# Patient Record
Sex: MALE | Race: WHITE
[De-identification: names, ages, dates, MRNs, and addresses within clinical notes are randomized per-mention and may not be internally consistent; named-entity substitution may affect disease eponyms.]

---

## 2018-04-19 ENCOUNTER — HOSPITAL ENCOUNTER (OUTPATIENT)
Dept: HOSPITAL 47 - CATHCVL | Age: 78
End: 2018-04-19
Payer: MEDICARE

## 2018-04-19 VITALS — BODY MASS INDEX: 32.1 KG/M2

## 2018-04-19 VITALS — RESPIRATION RATE: 16 BRPM

## 2018-04-19 VITALS — DIASTOLIC BLOOD PRESSURE: 62 MMHG | SYSTOLIC BLOOD PRESSURE: 123 MMHG | HEART RATE: 56 BPM

## 2018-04-19 VITALS — TEMPERATURE: 97.7 F

## 2018-04-19 DIAGNOSIS — I25.10: ICD-10-CM

## 2018-04-19 DIAGNOSIS — I08.0: Primary | ICD-10-CM

## 2018-04-19 DIAGNOSIS — Z95.5: ICD-10-CM

## 2018-04-19 DIAGNOSIS — I10: ICD-10-CM

## 2018-04-19 DIAGNOSIS — E11.9: ICD-10-CM

## 2018-04-19 DIAGNOSIS — Z79.899: ICD-10-CM

## 2018-04-19 DIAGNOSIS — Z79.84: ICD-10-CM

## 2018-04-19 DIAGNOSIS — Z79.82: ICD-10-CM

## 2018-04-19 DIAGNOSIS — Z95.1: ICD-10-CM

## 2018-04-19 DIAGNOSIS — Z87.891: ICD-10-CM

## 2018-04-19 DIAGNOSIS — Z82.49: ICD-10-CM

## 2018-04-19 DIAGNOSIS — E78.5: ICD-10-CM

## 2018-04-19 LAB — GLUCOSE BLD-MCNC: 116 MG/DL (ref 75–99)

## 2018-04-19 PROCEDURE — 93320 DOPPLER ECHO COMPLETE: CPT

## 2018-04-19 PROCEDURE — 93325 DOPPLER ECHO COLOR FLOW MAPG: CPT

## 2018-04-19 PROCEDURE — 93312 ECHO TRANSESOPHAGEAL: CPT

## 2018-04-19 RX ADMIN — BENZOCAINE ONE SPRAY: 200 SPRAY DENTAL; ORAL; PERIODONTAL at 11:39

## 2018-04-19 RX ADMIN — BENZOCAINE ONE SPRAY: 200 SPRAY DENTAL; ORAL; PERIODONTAL at 11:33

## 2018-05-07 NOTE — P.TEE
Indications for Procedure(s): 


Assessment of aortic stenosis


Date of Procedure: 04/19/18


Preoperative Diagnosis: 


Severe aortic stenosis


Postoperative Diagnosis: 


Moderate to severe aortic stenosis


Procedure(s) Performed: 


LAURENCE examination


Description of Procedure(s): 





INDICATION: This patient with history of previous bypass surgery was noted to 

have increasing gradient across the aortic valve.  Recent values are suggestive 

of possible severe aortic stenosis.  Patient is advised to have LAURENCE examination 

for further evaluation.





CONSENT: Informed consent was obtained verbally from patient





PROCEDURE:, Patient was brought to the lab in a fasting state.  He was prepped 

and draped in the usual fashion.  He was given IV Versed 2 mg and 50 g of 

fentanyl for conscious sedation.  Patient was continuously monitored.  A 

lubricated Omni probe was introduced into the oropharynx after his total was 

sprayed with Cetacaine.  The probe was gently advanced into the esophagus and 

stomach.  Multiple views were obtained.  Color and pulse  Doppler studies were 

performed.  Saline contrast bubble injections also performed.  Patient 

tolerated the procedure well





FINDINGS:.  The aortic valve appeared to be tricuspid with calcification and 

restricted opening excursion.  The valve area is calculated as 0.6 2.8 by 

planimetry.  He.  Gradient of 46 with a mean of 22 was obtained across the 

aortic valve.  These findings are size to moderate to severe aortic stenosis.  

Mitral valve showed mild regurgitation.  Left atrial appendage is of free of 

any clot.  The interatrial septum appeared to be intact without any spontaneous 

shunt.  Contrast bubble injection also did not reveal any shunting.  Left ankle 

function is good.  No Sigmund plaque in the aorta





IMPRESSION: #1.  Moderate to severe aortic stenosis.  #2.  No clot in the left 

atrial appendage.  #3.  No PFO #4.  Left ankle function is preserved





PLAN:.  Continue medical therapy.  Patient did a need cardiac catheterization 

to rule out any progression of ischemic heart disease and possible aortic valve 

replacement in the near future

## 2019-06-11 ENCOUNTER — HOSPITAL ENCOUNTER (OUTPATIENT)
Dept: HOSPITAL 47 - CATHCVL | Age: 79
LOS: 1 days | Discharge: HOME | End: 2019-06-12
Attending: INTERNAL MEDICINE
Payer: MEDICARE

## 2019-06-11 VITALS — BODY MASS INDEX: 32.3 KG/M2

## 2019-06-11 DIAGNOSIS — I25.82: ICD-10-CM

## 2019-06-11 DIAGNOSIS — I35.0: ICD-10-CM

## 2019-06-11 DIAGNOSIS — Z79.84: ICD-10-CM

## 2019-06-11 DIAGNOSIS — Z87.891: ICD-10-CM

## 2019-06-11 DIAGNOSIS — I25.810: ICD-10-CM

## 2019-06-11 DIAGNOSIS — I77.1: ICD-10-CM

## 2019-06-11 DIAGNOSIS — Z95.5: ICD-10-CM

## 2019-06-11 DIAGNOSIS — Z79.899: ICD-10-CM

## 2019-06-11 DIAGNOSIS — I25.10: Primary | ICD-10-CM

## 2019-06-11 DIAGNOSIS — Z79.82: ICD-10-CM

## 2019-06-11 DIAGNOSIS — E78.5: ICD-10-CM

## 2019-06-11 DIAGNOSIS — I10: ICD-10-CM

## 2019-06-11 DIAGNOSIS — I25.84: ICD-10-CM

## 2019-06-11 DIAGNOSIS — E78.00: ICD-10-CM

## 2019-06-11 DIAGNOSIS — E11.9: ICD-10-CM

## 2019-06-11 LAB
GLUCOSE BLD-MCNC: 124 MG/DL (ref 75–99)
GLUCOSE BLD-MCNC: 127 MG/DL (ref 75–99)
SAO2 % BLDA: 75.3 %
SAO2 % BLDA: 75.8 %
SAO2 % BLDA: 98.2 %

## 2019-06-11 PROCEDURE — 82810 BLOOD GASES O2 SAT ONLY: CPT

## 2019-06-11 PROCEDURE — 85025 COMPLETE CBC W/AUTO DIFF WBC: CPT

## 2019-06-11 PROCEDURE — 93461 R&L HRT ART/VENTRICLE ANGIO: CPT

## 2019-06-11 PROCEDURE — 85018 HEMOGLOBIN: CPT

## 2019-06-11 PROCEDURE — 80048 BASIC METABOLIC PNL TOTAL CA: CPT

## 2019-06-11 RX ADMIN — LISINOPRIL SCH MG: 20 TABLET ORAL at 16:44

## 2019-06-11 NOTE — PTCA
PERCUTANEOUSTRANS CORORONARY ANGIOGRAPHY



DATE OF SERVICE:

06/11/2019



PROCEDURE:

PTCA and stenting of saphenous vein graft to RCA in the proximal and mid portion of the

body of the graft.



PERFORMED BY:

Dr. CASSI Moon.



SEDATION:

Moderate conscious sedation time was 41 minutes.  The patient was administered Versed.

His oxygen saturation, hemodynamics and EKG were monitored closely.



CLINICAL INFORMATION:

Mr. Tim Meyers is a 78-year-old gentleman with diabetes, hypertension,

hyperlipidemia, CAD, prior bypass surgery and also mild to moderate aortic stenosis.

Apparently underwent aortocoronary bypass surgery in 1990 with LIMA to LAD, vein graft

to the obtuse marginal and vein graft to the RCA.  The vein graft to the obtuse

marginal was occluded.  He had a stenting of native circumflex performed in 2002.  He

came in with symptoms of unstable angina and also has aortic stenosis.  Dr. Farr

performed a right and left heart catheterization and study revealed that there was a

significant disease involving the vein graft to the RCA, both in the proximal and

distal portion of about 90% and 80% of the calcification.  He was advised intervention

that was performed in the same setting.  There was some tortuosity in the right iliac

area.  A Glidewire was used to gain access.



PROCEDURE NOTE:

The existing 6-Tamazight introducer in the right femoral artery was used to perform

procedure.  A Glidewire was used to advance and a right coronary bypass guide catheter

was used to cannulate the coronary artery.  A run-through wire was used to cross the

lesion.  Predilatation was performed with a 6 well mm long 2.5 caliber NC Trek balloon.

Multiple inflations were given.  The distal lesion was quite tight and at 15

atmospheres.  The balloon burst but there was improvement in lesion.  No consequences

occurred.  Then, I went over with a 3.25 caliber 15 mm long Xience stent and deployed

this at the distal lesion.  A 12 mm long 3.5 caliber Xience stent in the proximal

portion was deployed.  Patient received Angiomax bolus and infusion.  He received 180

mg of Brilinta.  Excellent angiographic result without complication was achieved.  The

arterial sheath was taken out and Angio-Seal device used to secure hemostasis.  The

venous sheath was sutured in and she was sent to the [facility] for the sheath to be pulled in

1 hour.



Excellent angiographic result without complication was achieved.  Findings were

discussed with the patient, his wife and also Dr. Farr.  I expect he will be

discharged tomorrow.





VALDO / TAVONN: [id number] / Job#: [id number]

## 2019-06-11 NOTE — P.CARDCATH
Date of Procedure: 06/11/19


Preoperative Diagnosis: 


Severe aortic stenosis by echo and also known ischemic heart disease


Postoperative Diagnosis: 


Noncritical aortic stenosis.  Critical lesions involving the RCA graft


Procedure(s) Performed: 


Right and left heart catheterization without left ventriculography


Description of Procedure: 


HISTORY: This is a 78-year-old gentleman with history of ischemic heart disease 

with a previous bypass surgery with the LIMA graft to the LAD, vein graft to the

OM branch and also vein graft to the RCA.  In 2002, patient was found to have 

total occlusion of the vein graft to the intermediate/OM branch and patent LIMA 

graft to the LAD and vein graft to the RCA.  Patient had stent placement of the 

OM branch.  Recently patient has been complaining of some chest pain and 

shortness of breath.  Patient had echocardiogram and LAURENCE were suggestive of 

severe aortic stenosis.  Patient is advised to have a right and left heart 

catheterization for definitive diagnosis





CONSENT:I have discussed the risks, benefits and alternative therapies for the 

above-mentioned procedure and for both sedation/analgesia as well as necessary 

blood product administration, if indicated, as they pertain to this patient.  

The patient has indicated understanding and acceptance of the risks and 

procedures discussed.











PROCEDURE: Left heart catheterization: Patient was brought to the lab in a 

fasting state.  Patient was given some IV sedation.  The right groin is 

infiltrated with lidocaine and right femoral artery was entered using Seldinger 

technique.  A 6-Malay catheter was left in place and selective coronary 

arteriography  was performed.  Patient tolerated the procedure well.  Femoral 

angiogram was performed .  Patient was found to have a critical lesion involving

the vein graft to the RCA.  He went on to have stent placement of the graft to 

the RCA.





                  Right heart catheterization: Right heart catheterization was 

performed using a Bomont-Piper catheter under Seldinger technique.  Patient 

tolerated the procedure well.





Hemodynamics: #1.  Right heart catheterization: Right atrial pressure was about 

4-6.  Right ventricular pressure was about 42/6.  Pulmonary artery pressure was 

about 42/8-12.  Pulmonary wedge pressure is about 12-14.  Cardiac output by 

thermodilution method was 4.9.  By Ashli method was 5.2.  The gradient across the

aortic valve was only 8.  There appears to be gradient between the aorta and f

emoral artery suggestive of aortoiliac disease.





              





Conscious Sedation: Versed 1 mg


Fentanyl 50 g


Duration to 2 minutes   








SELECTIVE CORONARY ARTERIOGRAPHY: 


                          LEFT MAIN: Could not be selectively engaged.  It 

provides only blood flow to the circumflex system.  Subselectively injected


                          THE LEFT ANTERIOR DESCENDING CORONARY ARTERY: .  This 

is totally occluded


                          THE LEFT CIRCUMFLEX AND IS CORONARY ARTERY: Consistent

OM branch and 2 other branches which appear to be patent and free of occlusive 

disease


                          THE RIGHT CORONARY ARTERY: .  Total occluded





                          The VEIN GRAFT TO THE RCA: This is moderate in caliber

with a diffuse plaque.  There is critical 95% stenosis in the proximal and 

distal portion of the body of the graft.  The distal native vessels appear to be

relatively small in caliber.





                          THE LIMA GRAFT TO THE LAD: The LIMA graft to LAD is 

patent throat its length except there appears to be kinking of the distal 

anastomosis.  This has been stable.  The distal LAD shows mild plaque without 

any significant lesions





      





LEFT VENTRICULOGRAPHY: Not performed





FINAL IMPRESSION: #1.  Noncritical aortic stenosis.  #2.  Critical lesion 

involving the vein graft to the RCA





PLAN: And placement of the graft to the RCA





PROGNOSIS: Guarded

## 2019-06-12 VITALS
RESPIRATION RATE: 20 BRPM | SYSTOLIC BLOOD PRESSURE: 171 MMHG | DIASTOLIC BLOOD PRESSURE: 78 MMHG | TEMPERATURE: 98.7 F | HEART RATE: 71 BPM

## 2019-06-12 LAB
ANION GAP SERPL CALC-SCNC: 7 MMOL/L
BUN SERPL-SCNC: 17 MG/DL (ref 9–20)
CALCIUM SPEC-MCNC: 9 MG/DL (ref 8.4–10.2)
CELLS COUNTED: 100
CHLORIDE SERPL-SCNC: 109 MMOL/L (ref 98–107)
CO2 SERPL-SCNC: 23 MMOL/L (ref 22–30)
EOSINOPHIL # BLD MANUAL: 0.28 K/UL (ref 0–0.7)
ERYTHROCYTE [DISTWIDTH] IN BLOOD BY AUTOMATED COUNT: 4.77 M/UL (ref 4.3–5.9)
ERYTHROCYTE [DISTWIDTH] IN BLOOD: 15.8 % (ref 11.5–15.5)
GLUCOSE SERPL-MCNC: 122 MG/DL (ref 74–99)
HCT VFR BLD AUTO: 40.8 % (ref 39–53)
HGB BLD-MCNC: 13.1 GM/DL (ref 13–17.5)
LYMPHOCYTES # BLD MANUAL: 1.56 K/UL (ref 1–4.8)
MCH RBC QN AUTO: 27.5 PG (ref 25–35)
MCHC RBC AUTO-ENTMCNC: 32.1 G/DL (ref 31–37)
MCV RBC AUTO: 85.6 FL (ref 80–100)
MONOCYTES # BLD MANUAL: 1.28 K/UL (ref 0–1)
NEUTROPHILS NFR BLD MANUAL: 78 %
NEUTS SEG # BLD MANUAL: 11.08 K/UL (ref 1.3–7.7)
PLATELET # BLD AUTO: 290 K/UL (ref 150–450)
POTASSIUM SERPL-SCNC: 4.7 MMOL/L (ref 3.5–5.1)
SODIUM SERPL-SCNC: 139 MMOL/L (ref 137–145)
WBC # BLD AUTO: 14.2 K/UL (ref 3.8–10.6)

## 2019-06-12 RX ADMIN — LISINOPRIL SCH MG: 20 TABLET ORAL at 08:36

## 2019-06-12 NOTE — P.PN
Subjective


Progress Note Date: 06/12/19


discharge note





This is a 78-year-old  gentleman with history of ischemic heart disease

with prior bypass surgery with a LIMA to the LAD, vein graft to the OM branch 

and also vein graft to the RCA.  In 2002 he was found to have a total occlusion 

of the vein graft to the intermediate OM branch and a patent LIMA graft to the 

LAD and vein graft to the RCA.  Patient had a stent placement of the OM branch. 

Recently the patient had been complaining of some chest discomfort with 

associated shortness of breath, he had an echo and a LAURENCE which were suggestive 

of aortic stenosis.  He was brought to the hospital by Dr. Farr underwent

a cardiac catheterization yesterdaysubsequent stenting of the saphenous vein 

graft to the right coronary artery in the proximal and midportion of the 

graft.patient was seen and examined this morning, feels well, denies any chest 

pain or difficulty in breathing.  Hemodynamically stable.  Laboratory data was 

reviewed and is within normal limits.  EKG from this morning shows a normal 

sinus rhythm with no acute changes from post-PCI.








Objective





- Vital Signs


Vital signs: 


                                   Vital Signs











Temp  98.7 F   06/12/19 08:00


 


Pulse  71   06/12/19 08:00


 


Resp  20   06/12/19 08:00


 


BP  171/78   06/12/19 08:00


 


Pulse Ox  98   06/12/19 08:00








                                 Intake & Output











 06/11/19 06/12/19 06/12/19





 18:59 06:59 18:59


 


Intake Total 1610 675 


 


Output Total 700 900 


 


Balance 910 -225 


 


Weight  91.1 kg 


 


Intake:   


 


  IV 1410 675 


 


    IV Fluid Continuation 1, 1000  





    000 ml @ 0 mls/hr IV .STK   





    -[facility] Rx#:[id number]   


 


    Sodium Chloride 0.9% 1, 75 675 





    000 ml @ 75 mls/hr IV .   





    [id number] [facility] Rx#:[id number]   


 


  Oral 200  


 


Output:   


 


  Urine 700 900 


 


Other:   


 


  Voiding Method   Urinal


 


  # Voids  1 














- Exam





PHYSICAL EXAMINATION: 





GENERAL:78-year-old  gentleman in no acute distress at the time of my 

examination





HEENT: Head is atraumatic, normocephalic.  Pupils equal, round.  Sclera 

anicteric. Conjunctiva are clear.  Mucous membranes of the mouth are moist.  

Neck is supple.  There is no elevated jugular venous pressure.no carotid  bruit 

is heard.





HEART EXAMINATION: [Heart S1, S2 systolic murmur is heard.  No murmur or gallop 

heard.]





CHEST EXAMINATION:[ Lungs are clear to auscultation and precussion. No chest 

wall tenderness is noted on palpation or with deep breathing.]





ABDOMEN: [ Soft, nontender. Bowel sounds are heard. No organomegaly noted].


 


EXTREMITIES:[ 2+ peripheral pulses with no evidence of peripheral edema and no 

calf tenderness noted].right groin soft, no evidence of any hematoma.





NEUROLOGIC [patient is awake, alert and oriented X3.]


 


.


 








- Labs


CBC & Chem 7: 


                                 06/12/19 05:57





                                 06/12/19 05:57


Labs: 


                  Abnormal Lab Results - Last 24 Hours (Table)











  06/11/19 06/12/19 06/12/19 Range/Units





  16:38 05:57 05:57 


 


WBC   14.2 H   (3.8-10.6)  k/uL


 


RDW   15.8 H   (11.5-15.5)  %


 


Neutrophils # (Manual)   11.08 H   (1.3-7.7)  k/uL


 


Monocytes # (Manual)   1.28 H   (0-1.0)  k/uL


 


Chloride    109 H  ()  mmol/L


 


Glucose    122 H  (74-99)  mg/dL


 


POC Glucose (mg/dL)  124 H    (75-99)  mg/dL














Assessment and Plan


Plan: 


assessment and plan


#1 status post angioplasty and stenting of the SVG to the RCA


#2 known history of coronary artery disease prior bypass surgery


#3 hypertension


#4 hyperlipidemia


#5 aortic stenosis





Plan


Patient may be discharged home today.  We'll make him a follow-up appointment to

see Dr. Farr in the office post discharge.patient will be discharged home

on aspirin 81 mg daily, Lipitor 60 mg daily, Norvasc 5 mg daily as of 

hypertension,that he attend milligrams daily, iron one tablet daily, Zestril 20 

mg daily, Toprol 50 mg at at bedtime,Brilinta 90 mg twice a day, and sublingual 

nitroglycerin as needed for chest pain along with his diabetic medications.





DNP note has been reviewed, I agree with a documented findings and plan of care.

 Patient was seen and examined.

## 2019-07-01 ENCOUNTER — HOSPITAL ENCOUNTER (OUTPATIENT)
Dept: HOSPITAL 47 - RADCTMAIN | Age: 79
Discharge: HOME | End: 2019-07-01
Attending: INTERNAL MEDICINE
Payer: MEDICARE

## 2019-07-01 DIAGNOSIS — I71.4: Primary | ICD-10-CM

## 2019-07-01 LAB — BUN SERPL-SCNC: 25 MG/DL (ref 9–20)

## 2019-07-01 PROCEDURE — 84520 ASSAY OF UREA NITROGEN: CPT

## 2019-07-01 PROCEDURE — 82565 ASSAY OF CREATININE: CPT

## 2019-07-16 ENCOUNTER — HOSPITAL ENCOUNTER (OUTPATIENT)
Dept: HOSPITAL 47 - RADCTMAIN | Age: 79
Discharge: HOME | End: 2019-07-16
Attending: INTERNAL MEDICINE
Payer: MEDICARE

## 2019-07-16 DIAGNOSIS — I71.4: Primary | ICD-10-CM

## 2019-07-16 LAB — BUN SERPL-SCNC: 18 MG/DL (ref 9–20)

## 2019-07-16 PROCEDURE — 82565 ASSAY OF CREATININE: CPT

## 2019-07-16 PROCEDURE — 84520 ASSAY OF UREA NITROGEN: CPT

## 2019-07-16 PROCEDURE — 75635 CT ANGIO ABDOMINAL ARTERIES: CPT

## 2019-07-16 PROCEDURE — 36415 COLL VENOUS BLD VENIPUNCTURE: CPT

## 2019-07-17 NOTE — CT
EXAMINATION TYPE: CT angio abd aorta w/Runoff

 

DATE OF EXAM: 7/16/2019

 

COMPARISON: None.

 

HISTORY: Aortic abdominal aneurysm without rupture

 

CT DLP: 2087 mGycm, Automated Exposure Control for Dose Reduction was Utilized.

 

CONTRAST: 

CTA scan of the abdomen and pelvis with lower extremity runoff is performed without oral but with IV 
Contrast, patient injected with 80 mL of Isovue 370. Three-D reconstructed images are created on an [employer] workstation and reviewed.

 

FINDINGS:

 

VASCULAR: There is moderate to severe peripheral calcified plaque of the abdominal aorta extending in
to branch vessels. No significant stenosis and celiac artery or SMA or bilateral single renal arterie
s though more prominent calcified plaque is seen in origin of right renal artery. Patent PAUL is seen.


 

Moderate to severe calcified plaque in common iliac arteries bilaterally without significant stenosis
. There is a patent internal iliac arteries bilaterally moderate to severe left-sided plaque and mode
rate distal right-sided plaque. No obvious skin stenosis. Patent external iliac arteries without sign
ificant plaque or stenosis. There is moderate plaque at the common femoral artery level and bilateral
 groin.

 

There is successful bifurcation into right superficial and deep femoral arteries without significant 
stenosis. Right superficial femoral artery shows moderate calcified plaque without significant stenos
is there is additional moderate calcified plaque extending into popliteal artery. There is suboptimal
 opacification below knee successful visualization of bifurcation trifurcation with fairly moderate t
o severe peripheral calcified plaque. No obvious stenosis or occlusion is seen with 2 vessel flow at 
level of ankle noted.

 

Left side shows successful bifurcation without significant stenosis. Moderate calcified plaque is see
n in the superficial femoral artery extending into popliteal artery less prominent than on the left s
rosy. There is successful visualization of bifurcation trifurcation with more moderate to severe perip
heral calcified plaque, there is successful two-vessel flow near ankle joint. No significant stenosis
 is evident.

 

No aneurysmal change in the abdominal aorta. No linear hypodensity to suggest dissection.

 

LUNG BASES: There is partial visualization of sternal wires presumed from CABG procedure

 

LIVER/GB:   No significant abnormality is appreciated.

 

PANCREAS:  No significant abnormality is seen.

 

SPLEEN: Occasional calcification consistent with product of old granulomatous disease..

 

ADRENALS:  No significant abnormality is seen.

 

KIDNEYS: Slight asymmetric diminished size to left kidney without cortical thinning or hydronephrosis
.

 

BOWEL: Scattered colonic diverticula most prominent in the sigmoid colon.

 

PROSTATE/SEMINAL VESICLES: Central zone calcifications seen in prostate gland measuring upper limits 
of normal in size. Adjacent pelvic phleboliths.

 

LYMPH NODES:  No greater than 1cm abdominal or pelvic lymph nodes are appreciated.

 

OSSEOUS STRUCTURES: Dextroconvex scoliosis lumbar spine. Moderate-to-severe multilevel spurring in th
e thoracolumbar spine.

 

EXTREMITIES: Degenerative changes bilateral knees most prominent left tibial tibiofemoral compartment
.

 

OTHER: Fairly moderate to severe diffuse calcified atherosclerotic changes without significant stenos
is identified in abdomen, pelvis, or either lower extremity through the ankle joint.

 

IMPRESSION:  No significant acute finding is seen to account for patient's clinical symptoms.

## 2020-05-06 ENCOUNTER — HOSPITAL ENCOUNTER (OUTPATIENT)
Dept: HOSPITAL 47 - LABWHC1 | Age: 80
Discharge: HOME | End: 2020-05-06
Attending: INTERNAL MEDICINE
Payer: MEDICARE

## 2020-05-06 DIAGNOSIS — U07.1: Primary | ICD-10-CM

## 2020-05-06 PROCEDURE — 87635 SARS-COV-2 COVID-19 AMP PRB: CPT

## 2020-05-08 ENCOUNTER — HOSPITAL ENCOUNTER (OUTPATIENT)
Dept: HOSPITAL 47 - CATHCVL | Age: 80
LOS: 1 days | Discharge: HOME | End: 2020-05-09
Attending: INTERNAL MEDICINE
Payer: MEDICARE

## 2020-05-08 VITALS — BODY MASS INDEX: 31.2 KG/M2

## 2020-05-08 VITALS — RESPIRATION RATE: 18 BRPM

## 2020-05-08 DIAGNOSIS — I25.710: Primary | ICD-10-CM

## 2020-05-08 DIAGNOSIS — Z79.899: ICD-10-CM

## 2020-05-08 DIAGNOSIS — E11.9: ICD-10-CM

## 2020-05-08 DIAGNOSIS — Z95.1: ICD-10-CM

## 2020-05-08 DIAGNOSIS — I10: ICD-10-CM

## 2020-05-08 DIAGNOSIS — F17.210: ICD-10-CM

## 2020-05-08 DIAGNOSIS — E78.5: ICD-10-CM

## 2020-05-08 DIAGNOSIS — I25.10: ICD-10-CM

## 2020-05-08 DIAGNOSIS — I35.0: ICD-10-CM

## 2020-05-08 DIAGNOSIS — Z79.84: ICD-10-CM

## 2020-05-08 DIAGNOSIS — I24.9: ICD-10-CM

## 2020-05-08 DIAGNOSIS — Z79.02: ICD-10-CM

## 2020-05-08 DIAGNOSIS — Z79.82: ICD-10-CM

## 2020-05-08 LAB
ANION GAP SERPL CALC-SCNC: 13 MMOL/L
BUN SERPL-SCNC: 16 MG/DL (ref 9–20)
CALCIUM SPEC-MCNC: 9.3 MG/DL (ref 8.4–10.2)
CELLS COUNTED: 100
CHLORIDE SERPL-SCNC: 106 MMOL/L (ref 98–107)
CO2 SERPL-SCNC: 20 MMOL/L (ref 22–30)
EOSINOPHIL # BLD MANUAL: 1.17 K/UL (ref 0–0.7)
ERYTHROCYTE [DISTWIDTH] IN BLOOD BY AUTOMATED COUNT: 4.81 M/UL (ref 4.3–5.9)
ERYTHROCYTE [DISTWIDTH] IN BLOOD: 14.1 % (ref 11.5–15.5)
GLUCOSE BLD-MCNC: 112 MG/DL (ref 75–99)
GLUCOSE BLD-MCNC: 113 MG/DL (ref 75–99)
GLUCOSE BLD-MCNC: 132 MG/DL (ref 75–99)
GLUCOSE BLD-MCNC: 137 MG/DL (ref 75–99)
GLUCOSE SERPL-MCNC: 143 MG/DL (ref 74–99)
HCT VFR BLD AUTO: 41.1 % (ref 39–53)
HGB BLD-MCNC: 12.8 GM/DL (ref 13–17.5)
LYMPHOCYTES # BLD MANUAL: 1.56 K/UL (ref 1–4.8)
MCH RBC QN AUTO: 26.6 PG (ref 25–35)
MCHC RBC AUTO-ENTMCNC: 31.2 G/DL (ref 31–37)
MCV RBC AUTO: 85.4 FL (ref 80–100)
MONOCYTES # BLD MANUAL: 0.65 K/UL (ref 0–1)
NEUTROPHILS NFR BLD MANUAL: 74 %
NEUTS SEG # BLD MANUAL: 9.62 K/UL (ref 1.3–7.7)
PLATELET # BLD AUTO: 366 K/UL (ref 150–450)
POTASSIUM SERPL-SCNC: 4.6 MMOL/L (ref 3.5–5.1)
SODIUM SERPL-SCNC: 139 MMOL/L (ref 137–145)
WBC # BLD AUTO: 13 K/UL (ref 3.8–10.6)

## 2020-05-08 PROCEDURE — 85025 COMPLETE CBC W/AUTO DIFF WBC: CPT

## 2020-05-08 PROCEDURE — 93455 CORONARY ART/GRFT ANGIO S&I: CPT

## 2020-05-08 PROCEDURE — 80048 BASIC METABOLIC PNL TOTAL CA: CPT

## 2020-05-08 NOTE — P.CARDCATH
Date of Procedure: 05/08/20


Preoperative Diagnosis: 


Unstable angina


Postoperative Diagnosis: 


Critical lesion involving the vein graft to the RCA near the stented area in the

distal portion


Procedure(s) Performed: 


Left heart catheterization without left ventriculography


Description of Procedure: 


HISTORY: This is a 79-year-old gentleman with history of multiple risk factors 

including diabetes with history of previous bypass surgery and recent stent 

placement of the graft to the RCA has been experiencing postprandial belching 

and fullness associated with discomfort in both arms.  Symptoms are suggestive 

of crescendo angina.  Patient is advised to have cardiac catheterization





CONSENT:I have discussed the risks, benefits and alternative therapies for the 

above-mentioned procedure and for both sedation/analgesia as well as necessary 

blood product administration, if indicated, as they pertain to this patient.  

The patient has indicated understanding and acceptance of the risks and 

procedures discussed.











PROCEDURE: Patient was brought to the lab in a fasting state.  Patient was given

some IV sedation.  The right groin is infiltrated with lidocaine and right 

femoral artery was entered using Seldinger technique.  A 6-Macedonian catheter was 

left in place and selective coronary arteriography was performed.  Patient 

tolerated the procedure well.  Patient went on to have stent placement Grafts to

the RCA  Femoral angiogram was performed and Angio-Seal was applied for 

hemostasis.  No immediate complications were noted and patient was transferred 

to [facility] in a stable condition





Conscious Sedation: Versed 1mg


Fentanyl 25 g


Duration 17minutes   


HEMODYNAMICS: The aortic pressure was 110/70.  The left ventricular end-

diastolic pressure was not measured





SELECTIVE CORONARY ARTERIOGRAPHY: 


                          LEFT MAIN: Not studied


                          THE LEFT ANTERIOR DESCENDING CORONARY ARTERY: Not 

studied but total occluded from previous


                          THE LEFT CIRCUMFLEX AND IS CORONARY ARTERY: Not 

studied


                          THE RIGHT CORONARY ARTERY: Not studied


                          The LIMA graft to the LAD: This is patent throat its 

length with a kink at the distal anastomosis.  This has been stable compared to 

the previous studies.


                          The vein graft to the RCA: Mrs. moderate caliber 

vessel with diffuse disease with a 95% stenosis in the distal portion near the 

previous stent





      





LEFT VENTRICULOGRAPHY: Not performed





FINAL IMPRESSION:.  Critical lesion involving the graft to the RCA





PLAN: Stent placement of the RCA to be done by Dr. Crook





PROGNOSIS: Guarded

## 2020-05-08 NOTE — PTCA
PERCUTANEOUSTRANS CORORONARY ANGIOGRAPHY



Mr. Meyers is an 79-year-old male, status post coronary artery bypass grafting and

percutaneous revascularization who has been followed by Dr. Farr and recently

had symptoms consistent with angina pectoris.  He underwent cardiac catheterization

today by Dr. Farr and was found to have critical stenosis involving the distal

segment of the saphenous vein graft to the right coronary artery distal to the prior

stent.  In view of that, recommendation made regarding angioplasty and stenting, the

procedures, risks, and complication were discussed with the patient who is in full

understanding and agreement.



PROCEDURE:

A 6-Kiswahili right coronary bypass guiding catheter was introduced in the system after

cannulating the ostium of the bypass.  A 0.014 balanced medium weight J-wire was

advanced across the lesion, positioned distally, then a 2.5 x 12 mm Trek balloon was

advanced, one inflation at 10 atmospheres was done.  Following that, the balloon was

removed and a 3.25 x 15 mm Xience Marga stent was deployed, postdilated at 16

atmospheres after the last inflation, after appropriate wait, the balloon and the

guidewire were withdrawn back in the guiding catheter.  Images were obtained and

repeated.  Those images reveal stable successful stenting.  At that point, the guiding

catheter, the balloon and the guidewire were removed.  The sheath was removed,

hemostasis was obtained with deployment of an Angio-Seal.  There was no immediate

complication. Patient was returned to his room in stable condition.  Of note, the

patient had no chest discomfort or significant EKG changes with the inflations.  He

received Angiomax per protocol and was continued on clopidogrel.



RESULTS:

Successful stenting of the distal segment of the saphenous vein graft to the right

coronary artery with reduction of stenosis from 99% to 0%.



RECOMMENDATION:

Patient will be continued on aspirin, Plavix and statin.  The importance of dual

antiplatelet treatment were discussed with the patient and he is in full understanding

and agreement.



Duration of procedure is 22 minutes.



MMODL / IJN: [id number] / Job#: [id number]

## 2020-05-09 VITALS — SYSTOLIC BLOOD PRESSURE: 171 MMHG | TEMPERATURE: 98.2 F | HEART RATE: 67 BPM | DIASTOLIC BLOOD PRESSURE: 69 MMHG

## 2020-05-09 LAB
ANION GAP SERPL CALC-SCNC: 8 MMOL/L
BUN SERPL-SCNC: 16 MG/DL (ref 9–20)
CALCIUM SPEC-MCNC: 9.5 MG/DL (ref 8.4–10.2)
CHLORIDE SERPL-SCNC: 105 MMOL/L (ref 98–107)
CO2 SERPL-SCNC: 24 MMOL/L (ref 22–30)
GLUCOSE BLD-MCNC: 147 MG/DL (ref 75–99)
GLUCOSE SERPL-MCNC: 130 MG/DL (ref 74–99)
POTASSIUM SERPL-SCNC: 4.9 MMOL/L (ref 3.5–5.1)
SODIUM SERPL-SCNC: 137 MMOL/L (ref 137–145)

## 2020-05-09 NOTE — PN
PROGRESS NOTE



Mr. Meyers is an 79-year-old male who presented with symptoms of angina pectoris,

underwent cardiac catheterization by Dr. Farr and was found to have significant

stenosis in the saphenous vein graft to the right coronary artery, underwent stenting

of that vessel.  He is doing well this morning.  Ambulating without difficulty.

Denying any chest pain.  No dizziness.  No palpitation.  No nausea.  He continues to be

in sinus mechanism.



Continues to be on aspirin 81 mg daily, amlodipine 5 mg daily, Lipitor 60 mg daily,

Plavix 75 mg daily, Zetia 10 mg daily, lisinopril 20 mg daily, metoprolol succinate 50

mg daily.



PHYSICAL EXAMINATION:

Blood pressure running in the 120s to 150s with a heart rate in the 60s.

LUNGS:  Clear.

HEART:  Regular rate and rhythm S1, S2.  No S3 with systolic murmur.  No diastolic

murmur.  No rub.

ABDOMEN:  Soft and nontender.

EXTREMITIES:  No edema. Right groin, no hematoma.



LAB DATA:

Lab data revealed BUN and creatinine 60 and 1.17.



IMPRESSION:

1. Status post stenting of the saphenous vein graft to the right coronary artery.

2. Hypertension.

3. Hyperlipidemia.

4. Diabetes mellitus.



RECOMMENDATIONS:

Patient will be discharged home today and followed as an outpatient next week with Dr. Farr.





MMODL / TAVONN: [id number] / Job#: [id number]

## 2020-11-06 ENCOUNTER — HOSPITAL ENCOUNTER (OUTPATIENT)
Dept: HOSPITAL 47 - CATHCVL | Age: 80
LOS: 1 days | Discharge: HOME | End: 2020-11-07
Attending: INTERNAL MEDICINE
Payer: MEDICARE

## 2020-11-06 VITALS — BODY MASS INDEX: 32.3 KG/M2

## 2020-11-06 DIAGNOSIS — I10: ICD-10-CM

## 2020-11-06 DIAGNOSIS — I65.22: ICD-10-CM

## 2020-11-06 DIAGNOSIS — Z95.1: ICD-10-CM

## 2020-11-06 DIAGNOSIS — I25.82: ICD-10-CM

## 2020-11-06 DIAGNOSIS — I35.0: ICD-10-CM

## 2020-11-06 DIAGNOSIS — E78.5: ICD-10-CM

## 2020-11-06 DIAGNOSIS — Z79.899: ICD-10-CM

## 2020-11-06 DIAGNOSIS — T82.858A: Primary | ICD-10-CM

## 2020-11-06 DIAGNOSIS — Z79.82: ICD-10-CM

## 2020-11-06 DIAGNOSIS — Z97.2: ICD-10-CM

## 2020-11-06 DIAGNOSIS — E11.9: ICD-10-CM

## 2020-11-06 DIAGNOSIS — H91.90: ICD-10-CM

## 2020-11-06 DIAGNOSIS — Z80.9: ICD-10-CM

## 2020-11-06 DIAGNOSIS — Z79.02: ICD-10-CM

## 2020-11-06 DIAGNOSIS — Z87.891: ICD-10-CM

## 2020-11-06 DIAGNOSIS — Z95.5: ICD-10-CM

## 2020-11-06 DIAGNOSIS — Z79.84: ICD-10-CM

## 2020-11-06 DIAGNOSIS — I25.9: ICD-10-CM

## 2020-11-06 DIAGNOSIS — I25.10: ICD-10-CM

## 2020-11-06 DIAGNOSIS — Z98.890: ICD-10-CM

## 2020-11-06 LAB
ANION GAP SERPL CALC-SCNC: 11 MMOL/L
BASOPHILS # BLD MANUAL: 0.14 K/UL (ref 0–0.2)
BUN SERPL-SCNC: 20 MG/DL (ref 9–20)
CALCIUM SPEC-MCNC: 9.3 MG/DL (ref 8.4–10.2)
CELLS COUNTED: 100
CHLORIDE SERPL-SCNC: 107 MMOL/L (ref 98–107)
CO2 SERPL-SCNC: 23 MMOL/L (ref 22–30)
EOSINOPHIL # BLD MANUAL: 0.68 K/UL (ref 0–0.7)
ERYTHROCYTE [DISTWIDTH] IN BLOOD BY AUTOMATED COUNT: 5.32 M/UL (ref 4.3–5.9)
ERYTHROCYTE [DISTWIDTH] IN BLOOD: 14.7 % (ref 11.5–15.5)
GLUCOSE BLD-MCNC: 146 MG/DL (ref 75–99)
GLUCOSE SERPL-MCNC: 165 MG/DL (ref 74–99)
HCT VFR BLD AUTO: 46.7 % (ref 39–53)
HGB BLD-MCNC: 15.1 GM/DL (ref 13–17.5)
LYMPHOCYTES # BLD MANUAL: 2.45 K/UL (ref 1–4.8)
MCH RBC QN AUTO: 28.4 PG (ref 25–35)
MCHC RBC AUTO-ENTMCNC: 32.3 G/DL (ref 31–37)
MCV RBC AUTO: 87.8 FL (ref 80–100)
MONOCYTES # BLD MANUAL: 0.95 K/UL (ref 0–1)
NEUTROPHILS NFR BLD MANUAL: 69 %
NEUTS SEG # BLD MANUAL: 9.38 K/UL (ref 1.3–7.7)
PH UR: 5.5 [PH] (ref 5–8)
PLATELET # BLD AUTO: 349 K/UL (ref 150–450)
POTASSIUM SERPL-SCNC: 4.7 MMOL/L (ref 3.5–5.1)
SODIUM SERPL-SCNC: 141 MMOL/L (ref 137–145)
SP GR UR: 1.02 (ref 1–1.03)
UROBILINOGEN UR QL STRIP: <2 MG/DL (ref ?–2)
WBC # BLD AUTO: 13.6 K/UL (ref 3.8–10.6)

## 2020-11-06 PROCEDURE — 80074 ACUTE HEPATITIS PANEL: CPT

## 2020-11-06 PROCEDURE — 83735 ASSAY OF MAGNESIUM: CPT

## 2020-11-06 PROCEDURE — 71046 X-RAY EXAM CHEST 2 VIEWS: CPT

## 2020-11-06 PROCEDURE — 82330 ASSAY OF CALCIUM: CPT

## 2020-11-06 PROCEDURE — 85025 COMPLETE CBC W/AUTO DIFF WBC: CPT

## 2020-11-06 PROCEDURE — 94150 VITAL CAPACITY TEST: CPT

## 2020-11-06 PROCEDURE — 93880 EXTRACRANIAL BILAT STUDY: CPT

## 2020-11-06 PROCEDURE — 85347 COAGULATION TIME ACTIVATED: CPT

## 2020-11-06 PROCEDURE — 84443 ASSAY THYROID STIM HORMONE: CPT

## 2020-11-06 PROCEDURE — 93312 ECHO TRANSESOPHAGEAL: CPT

## 2020-11-06 PROCEDURE — 80048 BASIC METABOLIC PNL TOTAL CA: CPT

## 2020-11-06 PROCEDURE — 93325 DOPPLER ECHO COLOR FLOW MAPG: CPT

## 2020-11-06 PROCEDURE — 87070 CULTURE OTHR SPECIMN AEROBIC: CPT

## 2020-11-06 PROCEDURE — 80053 COMPREHEN METABOLIC PANEL: CPT

## 2020-11-06 PROCEDURE — 85610 PROTHROMBIN TIME: CPT

## 2020-11-06 PROCEDURE — 83036 HEMOGLOBIN GLYCOSYLATED A1C: CPT

## 2020-11-06 PROCEDURE — 93320 DOPPLER ECHO COMPLETE: CPT

## 2020-11-06 PROCEDURE — 81003 URINALYSIS AUTO W/O SCOPE: CPT

## 2020-11-06 PROCEDURE — 93454 CORONARY ARTERY ANGIO S&I: CPT

## 2020-11-06 RX ADMIN — FENTANYL CITRATE ONE MCG: 50 INJECTION, SOLUTION INTRAMUSCULAR; INTRAVENOUS at 08:53

## 2020-11-06 RX ADMIN — FENTANYL CITRATE ONE MCG: 50 INJECTION, SOLUTION INTRAMUSCULAR; INTRAVENOUS at 08:51

## 2020-11-06 RX ADMIN — MIDAZOLAM ONE MG: 1 INJECTION INTRAMUSCULAR; INTRAVENOUS at 08:53

## 2020-11-06 RX ADMIN — MIDAZOLAM ONE MG: 1 INJECTION INTRAMUSCULAR; INTRAVENOUS at 08:51

## 2020-11-06 NOTE — P.TEE
Indications for Procedure(s): 


Severe aortic stenosis


Date of Procedure: 11/06/20


Preoperative Diagnosis: 


Severe aortic stenosis


Postoperative Diagnosis: 


Severe stenosis


Procedure(s) Performed: 


LAURENCE


Description of Procedure(s): 





INDICATION: This is a 79-year-old gentleman with history of ischemic heart 

disease with previous bypass surgery and also stent placement of the graft to 

the RCA done recently in May of this year has been experiencing shortness of 

breath and chest discomfort.  Transthoracic echo showed severe aortic stenosis. 

Patient is advised to have LAURENCE examination for further evaluation.  The aortic 

valve 





CONSENT: .  Informed consent was obtained from the patient verbally





PROCEDURE: Patient was brought to the lab in a fasting state.  He was prepped 

and draped in the usual fashion.  The throat was sprayed with Hurricaine.  A 

lubricated Omni probe was introduced in the oropharynx and was advanced into the

esophagus and also stomach.  Multiple views were obtained.  Patient was given 2 

mg Versed and 50 of fentanyl for sedation.  Color, pulsed and continuous and 

Doppler studies were performed.  Saline contrast bubble injections also 

performed.  Patient tolerated the procedure well.  No immediate complications





FINDINGS: .  The aortic valve is heavily calcified with restricted opening 

excursion.  Appears to be tricuspid.  The valve area by planimetry is about 0.8 

cm.  A peak gradient of 80 with a mean of 43 was obtained across the aortic 

valve suggestive of severe aortic stenosis.  There is mild mitral regurgitation 

and mild tricuspid regurgitation.  Left atrial appendage is free of any clot.  

Interatrial septum is intact without any shunt.  There is left atrial 

enlargement.  Left ventricle function appear to be fair.  There is moderate 

plaque in the aorta.  Saline contrast bubble injections did not reveal any 

crossing of bubbles across the septum of the atria





IMPRESSION: #1.  Severe aortic stenosis.  #2.  Mild mitral regurgitation #3.  No

PFO #4.  No clot in the left atrial appendage.  5.  Fairly preserved LV 

function.  #6.  Left atrial enlargement





PLAN: Patient needs aortic valve replacement .  Patient is going to have a 

cardiac catheterization.  May consider TAVR approach.

## 2020-11-06 NOTE — PTCA
PERCUTANEOUSTRANS CORORONARY ANGIOGRAPHY



Mr. Meyers is an 79-year-old male with a known history of coronary artery disease,

history of aortic valve disease status post coronary artery bypass grafting, prior

stenting of the saphenous vein graft to the right coronary artery, who has been

complaining of progressive dyspnea on exertion, was found to have severe aortic

stenosis and underwent cardiac catheterization and was found to have a significant

stenosis in the saphenous vein graft to the right coronary artery proximal to the

stented segment.  In view of that, recommendation was made regarding angioplasty and

stenting. The procedures, risks, and complications were discussed with the patient who

is in full understanding and agreement.



PROCEDURE DETAILS:

A 6-Italian right coronary bypass guiding catheter was introduced in the system.  After

cannulating the left main, a 0.014 balanced medium weight J-wire was advanced across

the lesion positioned distally. Then a 3.25 x 12 mm Xience Marga stent was deployed at

16 atmospheres.  Following that the balloon was removed.  The balloon was removed and a

3.5 x 15 mm NC Trek balloon was advanced and multiple inflations, maximum of 16

atmospheres were done.  After the last inflation, after appropriate wait, the balloon

and the guide wire was withdrawn back in the guiding catheter.  Images were obtained

and repeated.  Those images reveal stable successful stenting.  At that point, the

guiding catheter, the balloon and the guidewire were removed.  The sheath was removed.

Hemostasis was obtained with deployment of an Angio-Seal.  There was no immediate

complications.  Patient is returned to his room in stable condition.  Of note, the

patient had no chest discomfort or EKG changes with the inflations.  He received

Angiomax per protocol as well as continued on clopidogrel.



RESULTS:

Successful stenting of the proximal segment of the saphenous vein graft to the right

coronary artery with reduction of stenosis from 80% to less than 5%.



RECOMMENDATIONS:

Patient will be continued on aspirin, Plavix, statin.  The importance of dual

antiplatelet treatment were discussed with the patient who is in full understanding and

agreement.  The patient will be evaluated for undergoing transaortic valve replacement.



Duration of sedation is 18 minutes.





VALDO / TAVONN: [id number] / Job#: [id number]

## 2020-11-06 NOTE — P.CARDCATH
Date of Procedure: 11/06/20


Preoperative Diagnosis: 


Symptoms of shortness of breath, history of previous bypass and stent placement 

and also severe aortic stenosis


Postoperative Diagnosis: 


Significant stenosis involving the graft to the RCA proximal to the stent.


Procedure(s) Performed: 


Left heart catheterization without left ventriculography


Description of Procedure: 


HISTORY: This is a 79-year-old gentleman with history of ischemic heart disease 

with previous bypass surgery with  LIMA graft to the LAD and vein graft to the 

RCA.  Cardiac catheterization in May showed significant stenosis involving graft

to the RCA in the proximal and mid portions.  Patient had stent placement of 

both lesions.  Patient has been experiencing exertional shortness of breath and 

chest tightness and gaseous feeling.  Patient is advised to have a cardiac cath 

for definitive diagnosis





CONSENT:I have discussed the risks, benefits and alternative therapies for the 

above-mentioned procedure and for both sedation/analgesia as well as necessary 

blood product administration, if indicated, as they pertain to this patient.  

The patient has indicated understanding and acceptance of the risks and 

procedures discussed.











PROCEDURE: Patient was brought to the lab in a fasting state.  Patient was given

some IV sedation.  The right groin is infiltrated with lidocaine and right 

femoral artery was entered using Seldinger technique.  A 6-Latvian catheter was 

left in place and selective coronary arteriography and selective injection of 

the LIMA graft and vein graft to the RCA was performed  Patient tolerated the 

procedure well.  Patient went on to have stent placement of the vein graft to 

the RCA and proximal portion.  No immediate complications were noted and patient

was transferred to [facility] in a stable condition





Conscious Sedation: Versed 0mg


Fentanyl 0 g


Duration 25minutes   


HEMODYNAMICS: Aortic pressure is about 95/60.  Left ventricle end-diastolic 

pressures were not measured





SELECTIVE CORONARY ARTERIOGRAPHY: 


                          LEFT MAIN: Patent


                          THE LEFT ANTERIOR DESCENDING CORONARY ARTERY: Totally 

occluded in the proximal portion


                          THE LEFT CIRCUMFLEX AND IS CORONARY ARTERY: .  Totally

occluded after giving 2 OM branches.  The OM branch are free of occlusive 

disease


                          THE RIGHT CORONARY ARTERY: .  Totally occluded from 

the previous catheterization.


                          The LIMA graft to the LAD, this is patent throat its 

length except there appears to be an eccentric narrowing at the distal 

anastomosis.  This has been there on the previous studies.


                          The vein graft to the RCA: This developed a new lesion

in the proximal portion of the graft proximal to the previous stent.  Seemed to 

be about 80% eccentric lesion














LEFT VENTRICULOGRAPHY: Not performed





FINAL IMPRESSION: .  Critical lesion involving the vein graft to the RCA.  

Patent LIMA graft to the LAD.  All native vessels are totally occluded except 2 

OM branches/intermediate branches.  These branches are free of occlusive 

disease.  There are collaterals from the RCA to the circumflex and critical 

aortic stenosis by echo and LAURENCE





PLAN: Stent placement of the proximal portion of the graft to the RCA.  Aortic 

valve replacement with Approach





PROGNOSIS: Fair

## 2020-11-06 NOTE — US
EXAMINATION TYPE: US carotid duplex BILAT

 

DATE OF EXAM: 11/6/2020

 

COMPARISON: NONE

 

CLINICAL HISTORY: preop TAVR. Prior left CEA

 

EXAM MEASUREMENTS: 

 

RIGHT:  Peak Systolic Velocity (PSV) cm/sec

----- Right CCA:  53.4  

----- Right ICA:  219.1     

----- Right ECA:  215.1   

ICA/CCA ratio:  4.1    

 

RIGHT:  End Diastole cm/sec

----- Right CCA:  12.8   

----- Right ICA:  29.5      

----- Right ECA:  7.9     

 

LEFT:  Peak Systolic Velocity (PSV) cm/sec

----- Left CCA:  110.5  

----- Left ICA:  101.7   

----- Left ECA:  66.6  

ICA/CCA ratio:  0.9  

 

LEFT:  End Diastole cm/sec

----- Left CCA:  8.6  

----- Left ICA:  13.7   

----- Left ECA:  0.0 

 

VERTEBRALS (direction of flow):

Right Vertebral: Antegrade

Left Vertebral: Antegrade

 

Rhythm:  Arrhythmia

 

Moderate, mixed wall plaque is seen in bilateral carotid systems with abnormally elevated PSV in Righ
t ICA and Right ECA. Post left carotid endarterectomy echogenic wall changes are noted in left ICA.

 

 

 

 

IMPRESSION:  Previous left side carotid endarterectomy. There is antegrade flow in the vertebral apolinar
maddison. Images and measurements suggest 50-70% stenosis in the right internal carotid artery. There is 
less than 50% stenosis in the left internal carotid artery.   

 

 

Criteria for Assigning % of Stenosis / Diameter reduction

(Estimation based on the indirect measurements of the internal carotid artery velocities (ICA PSV).

1.  Normal (no stenosis)=ICA PSV < 125 cm/s: ratio < 2.0: ICA EDV<40 cm/s.

2. Less than 50% stenosis=ICA PSV < 125 cm/s: ratio < 2.0: ICA EDV<40 cm/s.

3.  50 to 69% stenosis=ICA PSV of 125 to 230 cm/s: ration 2.0 ? 4.0: ICA EDV  cm/s.

4.  Greater than 70% stenosis to near occlusion= ICA PSV > 230 cm/s: ratio > 4.0: ICA EDV > 100 cm/s.
 

5.  Near occlusion= ICA PSV velocities may be low or undetectable: variable ratio and ICA EDV.

6.  Total occlusion=unable to detect flow.

## 2020-11-06 NOTE — P.GSCN
<Ella Xie - Last Filed: 11/06/20 16:09>





History of Present Illness


Consult date: 11/06/20


Reason for Consult: 





Severe aortic stenosis


Requesting physician: Melo Farr


History of present illness: 





This is an 79-year-old gentleman who follows on an outpatient basis with Dr. Glasgow for primary care and Dr. Farr for cardiology.  He has a previous 

medical history of coronary artery disease with previous 3 vessel CABG in 1990 

with the LIMA to the LAD and saphenous vein grafts to the RCA and the third 

obtuse marginal artery with subsequent placement of stent to the vein graft to 

the RCA, aortic stenosis, hypertension, hyperlipidemia, non-insulin-dependent 

diabetes mellitus, left carotid stenosis status post endarterectomy in 2009, 

previous heavy tobacco dependence.  He has had continued complaints of 

exertional dyspnea.  In May of this year he underwent catheterization which 

revealed patent LIMA to the LAD but diffuse disease with 95% stenosis in the 

distal portion of the vein graft to the RCA near the previous stent.  At that 

time another stent was placed with reduction of stenosis to 0%.  He has 

continued to have exertional dyspnea as well as chest tightness and was 

recommended to undergo repeat heart catheterization as well as transesophageal 

echocardiogram.  Heart catheterization demonstrated critical lesion involving 

the vein graft to the RCA with patent LIMA to the LAD.  Transesophageal 

echocardiogram demonstrated severe aortic stenosis with aortic valve area 0.8 

cm, peak/mean gradients 80 mmHg/43 mmHg.  Dr. Farr did discuss the 

option of repeat open heart surgery with coronary bypass along with aortic valve

replacement.  The patient adamantly stated he does not want another open heart 

surgery.  Decision was made to re-stent the vein graft to the RCA with referral 

to Dr. Castle for TAVR consideration.





Review of Systems





Review of systems was completed and was negative except as noted





- Cardiovascular


Reports as per HPI, Reports chest pain, Reports dyspnea on exertion





Past Medical History


Past Medical History: Coronary Artery Disease (CAD), Diabetes Mellitus, Hearing 

Disorder / Deafness, Hyperlipidemia, Hypertension


Additional Past Medical History / Comment(s): SOB w/exertion, "LEAKY HEART 

VALVE."


History of Any Multi-Drug Resistant Organisms: None Reported


Past Surgical History: Coronary Bypass/CABG, Heart Catheterization With Stent


Additional Past Surgical History / Comment(s): CABG 1990 (LIMA to the LAD, vein 

graft to the RCA, vein graft to the third obtuse marginal).  LT CAROTID ARTERY 

endarterectomy 2009.  PTCA W/ 2 STENTS. 6/11/19 stent RCA, 05/08/20 stent to RCA

graft, 11/06/2020 stent to the RCA graft


Past Anesthesia/Blood Transfusion Reactions: No Reported Reaction


Date of Last Stent Placement:: 11/06/20


Past Psychological History: No Psychological Hx Reported


Smoking Status: Former smoker


Past Alcohol Use History: Occasional


Additional Past Alcohol Use History / Comment(s): SMOKED 30 YEARS, 1 PPD, QUIT 

2003 OR BEFORE


Past Drug Use History: None Reported





- Past Family History


  ** Mother


Family Medical History: Cancer





Medications and Allergies


                                Home Medications











 Medication  Instructions  Recorded  Confirmed  Type


 


Aspirin [Adult Low Dose Aspirin EC] 81 mg PO HS 04/16/18 11/06/20 History


 


Atorvastatin [Lipitor] 60 mg PO HS 04/16/18 11/06/20 History


 


Cholecalciferol (Vitamin D3) 2,000 unit PO DAILY 04/16/18 11/06/20 History





[Vitamin D3]    


 


Ezetimibe [Zetia] 10 mg PO DAILY 04/16/18 11/06/20 History


 


Ferrous Sulfate [Iron (65  mg PO DAILY 04/16/18 11/06/20 History





Elemental)]    


 


Metoprolol Succinate [Toprol XL] 50 mg PO HS 04/16/18 11/06/20 History


 


Multivitamins, Thera [Multivitamin 1 tab PO DAILY 04/16/18 11/06/20 History





(formulary)]    


 


Vitamin C Gummy 1 tab PO DAILY 04/16/18 11/06/20 History


 


Benazepril HCl 20 mg PO DAILY 05/30/19 11/06/20 History


 


Nitroglycerin Sl Tabs [Nitrostat] 0.4 mg SUBLINGUAL Q5M PRN #25 tab 06/12/19 11/06/20 Rx


 


sitaGLIPtin PHOS/metFORMIN HCL 1 each PO DAILY #0 06/12/19 11/06/20 Rx





[Janumet Xr  mg Tablet]    


 


Clopidogrel [Plavix] 75 mg PO DAILY 05/07/20 11/06/20 History


 


Pantoprazole Sodium 40 mg PO DAILY 05/07/20 11/06/20 History


 


Isosorbide Mononitrate [Isosorbide 30 mg PO DAILY 11/06/20 11/06/20 History





Mononitrate ER]    


 


amLODIPine [Norvasc] 10 mg PO DAILY 11/06/20 11/06/20 History








                                    Allergies











Allergy/AdvReac Type Severity Reaction Status Date / Time


 


No Known Allergies Allergy   Verified 11/04/20 13:07














Surgical - Exam


                                   Vital Signs











Temp Pulse Resp BP Pulse Ox


 


 98.2 F   95   18   127/59   97 


 


 11/06/20 08:03  11/06/20 08:03  11/06/20 08:03  11/06/20 08:03  11/06/20 08:03














- General


well developed, well nourished, no distress, no pain





- Eyes


normal ocular movement





- ENT





Patient does have partial dentures, but does have his own teeth, states he sees 

a dentist every 6 months


decreased hearing, dentures





- Neck


no masses, no bruits, trachea midline





- Respiratory





Lungs sounds diminished bilaterally.  Respirations even, nonlabored.  Currently 

on room air with oxygen saturation 98%.  No chest wall deformities.  No clubbing

 or cyanosis present.





- Cardiovascular





S1, S2 present.  Regular rate and rhythm, sinus rhythm on telemetry.  Sternum 

stable.  Palpable peripheral pulses bilaterally.  No edema present.  No calf 

pain or tenderness noted.





- Abdomen


Abdomen: soft, non tender, bowel sounds





- Genitourinary





Deferred





- Rectum





Deferred





- Integumentary





Anterior chest incision well healed


no rash, no growths





- Neurologic


normal coordination, normal sensation





- Musculoskeletal


normal gait, normal posture





- Psychiatric


oriented to time, oriented to person, oriented to place, speech is normal, 

memory intact





Results





- Labs





                                 11/06/20 07:45





                                 11/06/20 07:45


                  Abnormal Lab Results - Last 24 Hours (Table)











  11/06/20 11/06/20 11/06/20 Range/Units





  07:45 07:45 07:58 


 


WBC  13.6 H    (3.8-10.6)  k/uL


 


Neutrophils # (Manual)  9.38 H    (1.3-7.7)  k/uL


 


Glucose   165 H   (74-99)  mg/dL


 


POC Glucose (mg/dL)    146 H  (75-99)  mg/dL








                                 Diabetes panel











  11/06/20 Range/Units





  07:45 


 


Sodium  141  (137-145)  mmol/L


 


Potassium  4.7  (3.5-5.1)  mmol/L


 


Chloride  107  ()  mmol/L


 


Carbon Dioxide  23  (22-30)  mmol/L


 


BUN  20  (9-20)  mg/dL


 


Creatinine  1.25  (0.66-1.25)  mg/dL


 


Glucose  165 H  (74-99)  mg/dL


 


Calcium  9.3  (8.4-10.2)  mg/dL








                                  Calcium panel











  11/06/20 Range/Units





  07:45 


 


Calcium  9.3  (8.4-10.2)  mg/dL








                                 Pituitary panel











  11/06/20 Range/Units





  07:45 


 


Sodium  141  (137-145)  mmol/L


 


Potassium  4.7  (3.5-5.1)  mmol/L


 


Chloride  107  ()  mmol/L


 


Carbon Dioxide  23  (22-30)  mmol/L


 


BUN  20  (9-20)  mg/dL


 


Creatinine  1.25  (0.66-1.25)  mg/dL


 


Glucose  165 H  (74-99)  mg/dL


 


Calcium  9.3  (8.4-10.2)  mg/dL








                                  Adrenal panel











  11/06/20 Range/Units





  07:45 


 


Sodium  141  (137-145)  mmol/L


 


Potassium  4.7  (3.5-5.1)  mmol/L


 


Chloride  107  ()  mmol/L


 


Carbon Dioxide  23  (22-30)  mmol/L


 


BUN  20  (9-20)  mg/dL


 


Creatinine  1.25  (0.66-1.25)  mg/dL


 


Glucose  165 H  (74-99)  mg/dL


 


Calcium  9.3  (8.4-10.2)  mg/dL














- Imaging


Additional studies: 





Heart catheterization films and LAURENCE films reviewed with Dr. Castle





Assessment and Plan


Assessment: 





1.  Severe aortic stenosis with aortic valve area 0.8 cm, peak/mean gradients 

80 mmHg/43 mmHg


2.  Coronary artery disease with previous 3 vessel CABG in 1990 with the LIMA to

 the LAD and saphenous vein grafts to the RCA and the third obtuse marginal 

artery with subsequent placement of stent to the vein graft to the RCA x 2


3.  Hypertension


4.  Hyperlipidemia


5.  Non-insulin-dependent diabetes mellitus


6.  Left carotid stenosis status post endarterectomy in 2009


7.  Previous heavy tobacco dependence





Plan: 





The patient was seen and examined initially in the extended stay unit and 

subsequently on cardiac observation.  Chart/diagnostics were reviewed.  The case

 was discussed in detail with Dr. Castle will review the patient's heart 

catheterization and transesophageal echocardiogram films.  TAVR was discussed 

with the patient as he is adamant that he does not want repeat open heart 

surgery.  The patient is willing to consider TAVR, he prefers [facility] as that 

is where he had his first open heart surgery.  Preoperative testing was 

initiated.  Appointment was made to follow-up with Dr. Castle in the office next 

week to discuss TAVR further.  All diagnostics will be sent to the structural 

heart clinic at [facility].  Patient was informed he will need dental clearance 

prior to procedure.  All questions were answered.  More recommendations to 

follow.





Thank you Dr. Farr for this consult.  We look forward to working with you

 in the care of your patient.


Time with Patient: Greater than 30





<Mohinder Castle - Last Filed: 11/06/20 16:37>





Surgical - Exam


                                   Vital Signs











Temp Pulse Resp BP Pulse Ox


 


 98.2 F   95   18   127/59   97 


 


 11/06/20 08:03  11/06/20 08:03  11/06/20 08:03  11/06/20 08:03  11/06/20 08:03














Results





- Labs





                                 11/06/20 07:45





                                 11/06/20 07:45


                  Abnormal Lab Results - Last 24 Hours (Table)











  11/06/20 11/06/20 11/06/20 Range/Units





  07:45 07:45 07:58 


 


WBC  13.6 H    (3.8-10.6)  k/uL


 


Neutrophils # (Manual)  9.38 H    (1.3-7.7)  k/uL


 


Glucose   165 H   (74-99)  mg/dL


 


POC Glucose (mg/dL)    146 H  (75-99)  mg/dL








                                 Diabetes panel











  11/06/20 Range/Units





  07:45 


 


Sodium  141  (137-145)  mmol/L


 


Potassium  4.7  (3.5-5.1)  mmol/L


 


Chloride  107  ()  mmol/L


 


Carbon Dioxide  23  (22-30)  mmol/L


 


BUN  20  (9-20)  mg/dL


 


Creatinine  1.25  (0.66-1.25)  mg/dL


 


Glucose  165 H  (74-99)  mg/dL


 


Calcium  9.3  (8.4-10.2)  mg/dL








                                  Calcium panel











  11/06/20 Range/Units





  07:45 


 


Calcium  9.3  (8.4-10.2)  mg/dL








                                 Pituitary panel











  11/06/20 Range/Units





  07:45 


 


Sodium  141  (137-145)  mmol/L


 


Potassium  4.7  (3.5-5.1)  mmol/L


 


Chloride  107  ()  mmol/L


 


Carbon Dioxide  23  (22-30)  mmol/L


 


BUN  20  (9-20)  mg/dL


 


Creatinine  1.25  (0.66-1.25)  mg/dL


 


Glucose  165 H  (74-99)  mg/dL


 


Calcium  9.3  (8.4-10.2)  mg/dL








                                  Adrenal panel











  11/06/20 Range/Units





  07:45 


 


Sodium  141  (137-145)  mmol/L


 


Potassium  4.7  (3.5-5.1)  mmol/L


 


Chloride  107  ()  mmol/L


 


Carbon Dioxide  23  (22-30)  mmol/L


 


BUN  20  (9-20)  mg/dL


 


Creatinine  1.25  (0.66-1.25)  mg/dL


 


Glucose  165 H  (74-99)  mg/dL


 


Calcium  9.3  (8.4-10.2)  mg/dL














Assessment and Plan


Plan: 





The patient's films were reviewed with the nurse practitioner, he will follow up

 with me in the office next week for full TAVR consultation.

## 2020-11-07 VITALS
HEART RATE: 85 BPM | SYSTOLIC BLOOD PRESSURE: 146 MMHG | DIASTOLIC BLOOD PRESSURE: 72 MMHG | TEMPERATURE: 97.4 F | RESPIRATION RATE: 20 BRPM

## 2020-11-07 LAB
ALBUMIN SERPL-MCNC: 3.6 G/DL (ref 3.5–5)
ALP SERPL-CCNC: 107 U/L (ref 38–126)
ALT SERPL-CCNC: 21 U/L (ref 4–49)
ANION GAP SERPL CALC-SCNC: 6 MMOL/L
AST SERPL-CCNC: 25 U/L (ref 17–59)
BUN SERPL-SCNC: 17 MG/DL (ref 9–20)
CALCIUM SPEC-MCNC: 9.1 MG/DL (ref 8.4–10.2)
CELLS COUNTED: 100
CHLORIDE SERPL-SCNC: 106 MMOL/L (ref 98–107)
CO2 SERPL-SCNC: 26 MMOL/L (ref 22–30)
EOSINOPHIL # BLD MANUAL: 1.02 K/UL (ref 0–0.7)
ERYTHROCYTE [DISTWIDTH] IN BLOOD BY AUTOMATED COUNT: 5.01 M/UL (ref 4.3–5.9)
ERYTHROCYTE [DISTWIDTH] IN BLOOD: 14.7 % (ref 11.5–15.5)
GLUCOSE SERPL-MCNC: 127 MG/DL (ref 74–99)
HBA1C MFR BLD: 6.8 % (ref 4–6)
HCT VFR BLD AUTO: 44.6 % (ref 39–53)
HGB BLD-MCNC: 13.9 GM/DL (ref 13–17.5)
INR PPP: 1 (ref ?–1.2)
LYMPHOCYTES # BLD MANUAL: 1.46 K/UL (ref 1–4.8)
MAGNESIUM SPEC-SCNC: 1.7 MG/DL (ref 1.6–2.3)
MCH RBC QN AUTO: 27.7 PG (ref 25–35)
MCHC RBC AUTO-ENTMCNC: 31.1 G/DL (ref 31–37)
MCV RBC AUTO: 89.1 FL (ref 80–100)
MONOCYTES # BLD MANUAL: 1.75 K/UL (ref 0–1)
NEUTROPHILS NFR BLD MANUAL: 71 %
NEUTS SEG # BLD MANUAL: 10.37 K/UL (ref 1.3–7.7)
PLATELET # BLD AUTO: 302 K/UL (ref 150–450)
POTASSIUM SERPL-SCNC: 4.7 MMOL/L (ref 3.5–5.1)
PROT SERPL-MCNC: 6.7 G/DL (ref 6.3–8.2)
PT BLD: 10.1 SEC (ref 9–12)
SODIUM SERPL-SCNC: 138 MMOL/L (ref 137–145)
WBC # BLD AUTO: 14.6 K/UL (ref 3.8–10.6)

## 2020-11-07 RX ADMIN — NITROGLYCERIN PRN MG: 0.4 TABLET SUBLINGUAL at 03:26

## 2020-11-07 RX ADMIN — NITROGLYCERIN PRN MG: 0.4 TABLET SUBLINGUAL at 03:30

## 2020-11-07 RX ADMIN — NITROGLYCERIN PRN MG: 0.4 TABLET SUBLINGUAL at 03:20

## 2020-11-07 NOTE — XR
EXAMINATION TYPE: XR chest 2V

 

DATE OF EXAM: 11/7/2020

 

CLINICAL HISTORY: preop TAVR.

 

TECHNIQUE:  Frontal and lateral view of the chest.

 

COMPARISON: None chest radiograph

 

FINDINGS:  Sternotomy wires. Cardiomegaly. Prominent interstitial lung markings. There is no focal ai
r space opacity, pleural effusion, or pneumothorax seen. The osseous structures are intact.

 

IMPRESSION:

1. Cardiomegaly.

2. Prominent interstitial lung markings may represent pulmonary vascular congestion versus edema.

## 2020-11-07 NOTE — P.DS
Providers


Date of admission: 


11/06/2020





Expected date of discharge: 11/07/20


Attending physician: 


Melo Farr





Consults: 





                                        





11/06/20 10:43


Consult Physician Routine 


   Consulting Provider: [facility]


   Consult Reason/Comments: Post Interventional patient


   Do you want consulting provider notified?: Already Contacted











Primary care physician: 


Newton Glasgow








- Discharge Diagnosis(es)


(1) Aortic stenosis


Status: Acute   





(2) Coronary artery disease


Status: Acute   





(3) S/P CABG (coronary artery bypass graft)


Status: Acute   





(4) S/P coronary artery stent placement


Status: Acute   





(5) Diabetes mellitus


Status: Acute   


Hospital Course: 


This patient was brought in for elective cardiac catheterization on the LAURENCE 

examination for assessment of coronary artery disease and also aortic stenosis. 

Patient has been having exertional shortness of breath and gases feeling.  LAURENCE 

examination was consistent with severe aortic stenosis.  Cardiac catheterization

showed critical lesion involving the vein graft to the RCA just proximal to the 

previous stent placement.  Patient had a repeat stent placement by Dr. Crook.  

Patient was evaluated by Dr. Castle cardiac possible aortic valve replacement 

using TAVR approach.  Patient to be evaluated and scheduled for the procedure as

an outpatient.  Patient remained stable overnight.  No complaints of any chest 

pain or shortness of breath.  His lungs are clear.  Systolic murmur heard as 

before.  Branches site in the groin is doing well without any hematoma.  Patient

is being discharged home.  Follow-up in the office in one week








Plan - Discharge Summary


Discharge Rx Participant: No


New Discharge Prescriptions: 


Continue


   Aspirin [Adult Low Dose Aspirin EC] 81 mg PO HS


   Multivitamins, Thera [Multivitamin (formulary)] 1 tab PO DAILY


   Cholecalciferol (Vitamin D3) [Vitamin D3] 2,000 unit PO DAILY


   Ferrous Sulfate [Iron (65 MG Elemental)] 325 mg PO DAILY


   Atorvastatin [Lipitor] 60 mg PO HS


   Metoprolol Succinate [Toprol XL] 50 mg PO HS


   Ezetimibe [Zetia] 10 mg PO DAILY


   Vitamin C Gummy 1 tab PO DAILY


   Benazepril HCl 20 mg PO DAILY


   Nitroglycerin Sl Tabs [Nitrostat] 0.4 mg SUBLINGUAL Q5M PRN #25 tab


     PRN Reason: Chest Pain


   sitaGLIPtin PHOS/metFORMIN HCL [Janumet Xr  mg Tablet] 1 each PO DAILY 

#0


   Clopidogrel [Plavix] 75 mg PO DAILY


   Pantoprazole Sodium 40 mg PO DAILY


   Isosorbide Mononitrate [Isosorbide Mononitrate ER] 30 mg PO DAILY


   amLODIPine [Norvasc] 10 mg PO DAILY


Discharge Medication List





Aspirin [Adult Low Dose Aspirin EC] 81 mg PO HS 04/16/18 [History]


Atorvastatin [Lipitor] 60 mg PO HS 04/16/18 [History]


Cholecalciferol (Vitamin D3) [Vitamin D3] 2,000 unit PO DAILY 04/16/18 [History]


Ezetimibe [Zetia] 10 mg PO DAILY 04/16/18 [History]


Ferrous Sulfate [Iron (65 MG Elemental)] 325 mg PO DAILY 04/16/18 [History]


Metoprolol Succinate [Toprol XL] 50 mg PO HS 04/16/18 [History]


Multivitamins, Thera [Multivitamin (formulary)] 1 tab PO DAILY 04/16/18 

[History]


Vitamin C Gummy 1 tab PO DAILY 04/16/18 [History]


Benazepril HCl 20 mg PO DAILY 05/30/19 [History]


Nitroglycerin Sl Tabs [Nitrostat] 0.4 mg SUBLINGUAL Q5M PRN #25 tab 06/12/19 

[Rx]


sitaGLIPtin PHOS/metFORMIN HCL [Janumet Xr  mg Tablet] 1 each PO DAILY #0 

06/12/19 [Rx]


Clopidogrel [Plavix] 75 mg PO DAILY 05/07/20 [History]


Pantoprazole Sodium 40 mg PO DAILY 05/07/20 [History]


Isosorbide Mononitrate [Isosorbide Mononitrate ER] 30 mg PO DAILY 11/06/20 

[History]


amLODIPine [Norvasc] 10 mg PO DAILY 11/06/20 [History]








Follow up Appointment(s)/Referral(s): 


Mohinder Castle MD [STAFF PHYSICIAN] - 11/12/20 2:00 pm


Melo Farr MD [STAFF PHYSICIAN] - 1 Week (APPOINTMENT MADE ON FRIDAY, NOVEMBER 13TH @ 1:45PM )


Patient Instructions/Handouts:  *[facility] - After Heart Catheterization - 

Ambulatory Care Instructions, Aortic Stenosis (DC), Cardiac Rehabilitation (ED),

Coronary Intravascular Stent Placement (DC), Transcatheter Aortic Valve 

Replacement (DC), Procedural Sedation (ED)


Activity/Diet/Wound Care/Special Instructions: 


*NO LIFTING, PUSHING, OR PULLING ANYTHING OVER 10 POUNDS FOR 5 DAYS


*NO DRIVING FOR 3 DAYS


*YOU CAN SHOWER TOMORROW BUT DO NOT SUBMERSE YOUR PUNCTURE SITE IN WATER FOR A 

FEW DAYS TO PREVENT INFECTION - SO NO TUB BATHS, POOLS, HOT TUBS, DISHES....ETC.


*ANY SIGNS OF BLEEDING (HARDNESS, SWELLING, OR EXCESSIVE BRUISING) HOLD DIRECT 

PRESSURE ON YOUR PUNCTURE SITE AND COME TO THE NEAREST EMERGENCY ROOM TO GET 

YOUR PUNCTURE SITE LOOKED AT - DO NOT DRIVE YOURSELF! EITHER CALL EMS OR HAVE 

SOMEONE DRIVE YOU


Discharge Disposition: HOME SELF-CARE

## 2020-11-13 NOTE — CONS
CONSULTATION



DATE OF CONSULTATION:

11/6/20



I have seen, examined and agree with the midlevel's findings.





MMODL / IJN: [id number] / Job#: 2

## 2021-01-15 ENCOUNTER — HOSPITAL ENCOUNTER (EMERGENCY)
Dept: HOSPITAL 47 - EC | Age: 81
Discharge: HOME | End: 2021-01-15
Payer: MEDICARE

## 2021-01-15 VITALS
TEMPERATURE: 98 F | DIASTOLIC BLOOD PRESSURE: 64 MMHG | SYSTOLIC BLOOD PRESSURE: 137 MMHG | RESPIRATION RATE: 18 BRPM | HEART RATE: 71 BPM

## 2021-01-15 DIAGNOSIS — E78.5: ICD-10-CM

## 2021-01-15 DIAGNOSIS — I10: ICD-10-CM

## 2021-01-15 DIAGNOSIS — Z87.891: ICD-10-CM

## 2021-01-15 DIAGNOSIS — Z79.82: ICD-10-CM

## 2021-01-15 DIAGNOSIS — Z79.899: ICD-10-CM

## 2021-01-15 DIAGNOSIS — I25.10: ICD-10-CM

## 2021-01-15 DIAGNOSIS — R39.9: ICD-10-CM

## 2021-01-15 DIAGNOSIS — Z95.2: ICD-10-CM

## 2021-01-15 DIAGNOSIS — Z95.5: ICD-10-CM

## 2021-01-15 DIAGNOSIS — Z95.1: ICD-10-CM

## 2021-01-15 DIAGNOSIS — Z79.02: ICD-10-CM

## 2021-01-15 DIAGNOSIS — Z46.6: Primary | ICD-10-CM

## 2021-01-15 PROCEDURE — 99283 EMERGENCY DEPT VISIT LOW MDM: CPT

## 2021-07-22 ENCOUNTER — HOSPITAL ENCOUNTER (INPATIENT)
Dept: HOSPITAL 47 - EC | Age: 81
LOS: 2 days | Discharge: HOME | DRG: 247 | End: 2021-07-24
Attending: INTERNAL MEDICINE | Admitting: INTERNAL MEDICINE
Payer: MEDICARE

## 2021-07-22 VITALS — BODY MASS INDEX: 31.3 KG/M2

## 2021-07-22 DIAGNOSIS — Z79.82: ICD-10-CM

## 2021-07-22 DIAGNOSIS — D72.829: ICD-10-CM

## 2021-07-22 DIAGNOSIS — I25.710: ICD-10-CM

## 2021-07-22 DIAGNOSIS — E11.51: ICD-10-CM

## 2021-07-22 DIAGNOSIS — Z87.891: ICD-10-CM

## 2021-07-22 DIAGNOSIS — H91.90: ICD-10-CM

## 2021-07-22 DIAGNOSIS — Z79.899: ICD-10-CM

## 2021-07-22 DIAGNOSIS — Z79.01: ICD-10-CM

## 2021-07-22 DIAGNOSIS — I21.4: Primary | ICD-10-CM

## 2021-07-22 DIAGNOSIS — K59.00: ICD-10-CM

## 2021-07-22 DIAGNOSIS — I44.7: ICD-10-CM

## 2021-07-22 DIAGNOSIS — I48.0: ICD-10-CM

## 2021-07-22 DIAGNOSIS — I11.9: ICD-10-CM

## 2021-07-22 DIAGNOSIS — Z95.1: ICD-10-CM

## 2021-07-22 DIAGNOSIS — I25.2: ICD-10-CM

## 2021-07-22 DIAGNOSIS — E78.00: ICD-10-CM

## 2021-07-22 DIAGNOSIS — Z79.02: ICD-10-CM

## 2021-07-22 DIAGNOSIS — Z95.2: ICD-10-CM

## 2021-07-22 DIAGNOSIS — K21.9: ICD-10-CM

## 2021-07-22 DIAGNOSIS — I25.110: ICD-10-CM

## 2021-07-22 LAB
ALBUMIN SERPL-MCNC: 4.1 G/DL (ref 3.5–5)
ALP SERPL-CCNC: 115 U/L (ref 38–126)
ALT SERPL-CCNC: 21 U/L (ref 4–49)
ANION GAP SERPL CALC-SCNC: 9 MMOL/L
APTT BLD: 22.3 SEC (ref 22–30)
AST SERPL-CCNC: 25 U/L (ref 17–59)
BUN SERPL-SCNC: 18 MG/DL (ref 9–20)
CALCIUM SPEC-MCNC: 9.8 MG/DL (ref 8.4–10.2)
CELLS COUNTED: 100
CHLORIDE SERPL-SCNC: 109 MMOL/L (ref 98–107)
CO2 SERPL-SCNC: 20 MMOL/L (ref 22–30)
EOSINOPHIL # BLD MANUAL: 1.13 K/UL (ref 0–0.7)
ERYTHROCYTE [DISTWIDTH] IN BLOOD BY AUTOMATED COUNT: 5.06 M/UL (ref 4.3–5.9)
ERYTHROCYTE [DISTWIDTH] IN BLOOD: 15.6 % (ref 11.5–15.5)
GLUCOSE BLD-MCNC: 119 MG/DL (ref 75–99)
GLUCOSE BLD-MCNC: 153 MG/DL (ref 75–99)
GLUCOSE SERPL-MCNC: 119 MG/DL (ref 74–99)
HCT VFR BLD AUTO: 43.1 % (ref 39–53)
HGB BLD-MCNC: 14.2 GM/DL (ref 13–17.5)
INR PPP: 1 (ref ?–1.2)
LIPASE SERPL-CCNC: 187 U/L (ref 23–300)
LYMPHOCYTES # BLD MANUAL: 1.63 K/UL (ref 1–4.8)
MAGNESIUM SPEC-SCNC: 1.9 MG/DL (ref 1.6–2.3)
MCH RBC QN AUTO: 28.1 PG (ref 25–35)
MCHC RBC AUTO-ENTMCNC: 33 G/DL (ref 31–37)
MCV RBC AUTO: 85.2 FL (ref 80–100)
MONOCYTES # BLD MANUAL: 0.88 K/UL (ref 0–1)
NEUTROPHILS NFR BLD MANUAL: 71 %
NEUTS SEG # BLD MANUAL: 8.88 K/UL (ref 1.3–7.7)
PLATELET # BLD AUTO: 313 K/UL (ref 150–450)
POTASSIUM SERPL-SCNC: 4.6 MMOL/L (ref 3.5–5.1)
PROT SERPL-MCNC: 7.3 G/DL (ref 6.3–8.2)
PT BLD: 10.3 SEC (ref 9–12)
SODIUM SERPL-SCNC: 138 MMOL/L (ref 137–145)
WBC # BLD AUTO: 12.5 K/UL (ref 3.8–10.6)

## 2021-07-22 PROCEDURE — 36415 COLL VENOUS BLD VENIPUNCTURE: CPT

## 2021-07-22 PROCEDURE — 99285 EMERGENCY DEPT VISIT HI MDM: CPT

## 2021-07-22 PROCEDURE — 85730 THROMBOPLASTIN TIME PARTIAL: CPT

## 2021-07-22 PROCEDURE — 83690 ASSAY OF LIPASE: CPT

## 2021-07-22 PROCEDURE — 80061 LIPID PANEL: CPT

## 2021-07-22 PROCEDURE — 84484 ASSAY OF TROPONIN QUANT: CPT

## 2021-07-22 PROCEDURE — 85025 COMPLETE CBC W/AUTO DIFF WBC: CPT

## 2021-07-22 PROCEDURE — 83735 ASSAY OF MAGNESIUM: CPT

## 2021-07-22 PROCEDURE — 93005 ELECTROCARDIOGRAM TRACING: CPT

## 2021-07-22 PROCEDURE — 74176 CT ABD & PELVIS W/O CONTRAST: CPT

## 2021-07-22 PROCEDURE — 71046 X-RAY EXAM CHEST 2 VIEWS: CPT

## 2021-07-22 PROCEDURE — 93455 CORONARY ART/GRFT ANGIO S&I: CPT

## 2021-07-22 PROCEDURE — 80053 COMPREHEN METABOLIC PANEL: CPT

## 2021-07-22 PROCEDURE — 85610 PROTHROMBIN TIME: CPT

## 2021-07-22 PROCEDURE — 93306 TTE W/DOPPLER COMPLETE: CPT

## 2021-07-22 PROCEDURE — 80048 BASIC METABOLIC PNL TOTAL CA: CPT

## 2021-07-22 RX ADMIN — ISOSORBIDE MONONITRATE SCH MG: 60 TABLET, EXTENDED RELEASE ORAL at 20:07

## 2021-07-22 NOTE — CONS
CONSULTATION



CHIEF COMPLAINT:

Chest pain.



Mr. Meyers is an 80-year-old gentleman with history of coronary artery disease status

post CABG status post angioplasty of venous graft to the right coronary artery in

October of last year, hypertension, diabetes, and dyslipidemia who presented to

hospital with progressively worsening discomfort involving both upper extremities for

the last 2 weeks.  The patient states that he also had epigastric discomfort and some

shortness of breath with activity.  He recently underwent a stress test in our office.

We do not have the results at this time.  The patient states that his pain responds to

sublingual nitroglycerin.  EKG shows sinus rhythm with left bundle branch block.

Troponin has come back elevated at 0.039.  The 2nd set.  The first set was normal.  The

patient's clinical presentation is consistent with acute non ST-segment elevation MI.

We are treating him with aspirin nitrates, beta blockers and intravenous heparin.  I

advised him to undergo cardiac catheterization for further evaluation.  I am going to

review his stress test results tomorrow and make a final decision based on how his

symptoms evolve, the cardiac enzymes change and what happens to his stress test.



PAST MEDICAL HISTORY:

Significant for coronary artery disease status post CABG status post recent

angioplasty, hypertension, diabetes, dyslipidemia.  I reviewed recent angiographic

data.



MEDICATIONS:

Medications at home included metformin, Norvasc, Nitrostat, Toprol-XL, _____ iron,

Zetia, Plavix, Lipitor, aspirin and Amoxicillin.



ALLERGIES:

No known drug allergies.



FAMILY HISTORY:

Negative for premature coronary artery disease.



SOCIAL HISTORY:

Negative for current smoking, EtOH abuse, or drug abuse.



REVIEW OF SYSTEMS:

HEENT is unremarkable.

CARDIAC as described above.

RESPIRATORY negative.

GI negative.

: Negative.

ALLERGY/IMMUNOLOGY: Negative.

SKIN negative.

MUSCULOSKELETAL negative.

ENDOCRINE negative.

HEMATOLOGICAL negative.

DERM: Negative.

CONSTITUTIONAL: Negative.

ONCOLOGICAL: Negative.

CNS negative.



Rest of the system review is not relevant.



EXAM:

Comfortable at rest, afebrile. Heart rate is 76 beats a minute.

Blood pressure is 134/64, respiratory rate is 16, O2 saturation is 99% on room air.

There is no jugular venous distention.  Carotid upstroke is normal.  There is no bruit.

CHEST exam reveals good air entry bilaterally.

HEART exam reveals first and second heart sounds.  No gallop.  Has an ejection systolic

murmur in the aortic area.

ABDOMEN is soft.

Examination of the EXTREMITIES reveals mild edema.  Peripheral pulses are felt.



LABS:

As described above.  EKG is as described above.



ASSESSMENT:

Acute non ST-segment elevation myocardial infarction in a patient with known coronary

artery disease status post CABG status post recent angioplasty of venous graft to the

right coronary artery.



PLAN:

I am going to review outpatient stress test, treat the patient with aggressive medical

therapy, obtain a 2D echo and consider cardiac catheterization tomorrow if he is

agreeable to it.





MMGERALDINE / TAVONN: [id number] / Job#: [id number]

## 2021-07-22 NOTE — CT
EXAMINATION TYPE: CT abdomen pelvis wo con

 

DATE OF EXAM: 7/22/2021

 

HISTORY: EPIGASTRIC PAIN

 

TECHNIQUE:  Departmental protocol. Automated exposure control for dose reduction was used.

CT DLP: 1139 mGycm

 

COMPARISON: 7/16/2019

 

FINDINGS: 

 

LUNG BASES: No acute process.

 

LIVER/GB: No significant abnormality is appreciated.

 

PANCREAS: No significant abnormality is seen.

 

SPLEEN: No significant abnormality is seen.

 

ADRENALS: No significant abnormality is seen.

 

KIDNEYS: No significant abnormality is seen.

 

PERITONEAL CAVITY: No pneumoperitoneum or peritoneal fluid. 

 

REPRODUCTIVE ORGANS: No significant abnormality is seen

 

URINARY BLADDER:  No significant abnormality is seen.

 

PELVIC ADENOPATHY:  None visualized.

 

OSSEOUS STRUCTURES:  No significant abnormality is seen.

 

BOWEL:  No significant abnormality is seen.

 

 

IMPRESSION: 

No acute CT process.

## 2021-07-22 NOTE — P.HPIM
History of Present Illness


H&P Date: 07/22/21





History of Presenting Illness:





Patient is a very pleasant 80-year-old male with a past medical history of CAD 

with CABG followed by stenting, recent TAVR on 1/7/21 at [facility], 

hypertension, hyperlipidemia, chronic anemia, and type II non-insulin-dependent 

diabetes mellitus.  Patient presented to the emergency department with a chief 

complaint of epigastric bloating and discomfort and bilat arm pain.  Patient 

reports these symptoms have been accompanied by acid reflux and constipation.  

Patient reports this episode began approximately 5-6 days ago in which he 

started experiencing abdominal bloating, uncontrolled belching and flatulence, 

gastric reflex, constipation, and pain in bilateral arms.  Patient reports he 

has tried a combination of medications to assist including an over-the-counter 

medication called Peptiva, pomegranate, Pepto-Bismol, nitro, and laxatives.  

Patient states that he was taking laxatives daily 3 days until he had a bowel 

movement.  Patient reports he had a couple days where he was having normal bowel

function but reports he has returned to being constipated with last bowel 

movement being yesterday morning.  Patient reports significant abdominal 

bloating and discomfort and states that he talk to his PCP, Dr. Glasgow, and 

told him he took some Pepto-Bismol and a nitroglycerin tablet and his doctor 

advised him to go straight to the ER for evaluation.  In the emergency 

department patient was seen and fully evaluated. Chest x-ray completed negative 

for acute cardiopulmonary process, revealing coronary artery calcifications 

along with prominent lung volume with flattening of the hemidiaphragms possibly 

indicated of underlying COPD. EKG showing normal sinus rhythm at 66 bpm with a 

left bundle branch block with T-wave inversion in lateral and inferior leads 

unchanged from previous EKG obtained on 11/6/20. Labs reviewed: CMP showing no 

significant abnormalities.  Initial troponin of 0.032 with repeat troponin flat 

at 0.039.  CBC revealing mild leukocytosis with a left shift with a WBC count of

12.5 and neutrophils of 8.88.  Coagulation profile normal findings.  Patient 

started on heparin per ACS protocol.  Patient admitted under our services with 

consultation to cardiology.  Upon assessment, patient very tender to palpation 

epigastric and left upper quadrant.  Abdomen was soft and distended.  Patient 

denied having any recent known fevers, chills, diaphoresis, headache, 

lightheadedness, dizziness, chest pain or palpitations, shortness of breath or 

dyspnea with exertion, nausea, vomiting, hematemesis, melena, hematochezia, 

experiencing any difficulties with or changes in his urinary function, or having

any noted numbness/weakness/swelling in his extremities.  Patient reports he 

follows outpatient with his cardiologist Dr. Wu.








Review of systems:





Pertinent positives and negatives as discussed in HPI, a complete review of 

systems was performed and all other systems are negative.





Physical exam:





Vital signs reviewed and stable. 


General: Nontoxic, no distress and appears stated age.  


Derm: Skin warm and dry, normal coloration for ethnicity.


Head: Atraumatic, normocephalic and symmetric.  


Eyes: EOMs intact, no lid lag, and anicteric sclera


Mouth: no lip lesions, mucus membranes moist


Cardiovascular: regular rate and rhythm with normal S1S2, no murmur, positive p

osterior tibial pulses bilaterally, and cap refill < 2 seconds.  


Lungs: Respirations even, regular, and unlabored on room air. Lungs CTA 

bilaterally, no rhonchi, no rales, no wheezing, and no accessory muscle usage. 


Abdominal:  obese abdomen, soft distended, tenderness to palpation to left upper

quadrant and epigastric region, no guarding, no appreciable organomegaly


Ext: ROM intact. No gross muscle atrophy, no edema, no contractures


Neuro: Speech clear, face symmetrical and CN II-XII grossly intact with no noted

focal neuro deficits


Psych: Alert and oriented to person, place, time, and situation. Appropriate and

pleasant affect. 





Assessment and Plan of Care:





Epigastric pain, rule out acute coronary event versus intra-abdominal process


Bilateral arm pain


Abdominal bloating with constipation


-EKG showing normal sinus rhythm at 66 bpm with a left bundle branch block with 

T-wave inversion in lateral and inferior leads unchanged from previous EKG 

obtained on 11/6/20. 


-Initial troponin of 0.032 with repeat troponin flat at 0.039.  


-CMP showing no significant abnormalities with normal liver and pancreatic 

enzymes.


-CBC revealing mild leukocytosis with a left shift with a WBC count of 12.5 and 

neutrophils of 8.88.  


-Coagulation profile normal findings.  


-Patient started on heparin per ACS protocol until acute coronary event can't be

ruled out.


-Cardiology consulted


-Telemetry monitoring


-Patient given aspirin 1 dose and to continue with daily aspirin, Plavix, 

atorvastatin, Zetia, Imdur, lisinopril, and metoprolol.


-Nitro when necessary for chest pain


-GI prophylaxis with Protonix


-CT abdomen and pelvis without contrast





CAD with CABG followed by stenting and recent TAVR on 1/7/21 


-EKG showing normal sinus rhythm at 66 bpm with a left bundle branch block with 

T-wave inversion in lateral and inferior leads unchanged from previous EKG 

obtained on 11/6/20. 


-Initial troponin of 0.032 with repeat troponin flat at 0.039.  


-Patient started on heparin per ACS protocol until acute coronary event can't be

ruled out.


-Cardiology consulted


-Telemetry monitoring


-Patient given aspirin 1 dose and to continue with daily aspirin, Plavix, 

atorvastatin, Zetia, Imdur, lisinopril, and metoprolol.


-Nitro when necessary for chest pain





Hypertension


-Monitor vital signs and continue daily medication regimen with Imdur, 

lisinopril, metoprolol..





Hyperlipidemia


-Continue daily medication regimen with atorvastatin and Zetia





Type II non-insulin-dependent diabetes mellitus


-Hold oral glycemic medication in place patient on glycemic protocol with 

NovoLog sliding scale.











The patient is admitted with an anticipated greater than 2 midnight stay for 

evaluation of epigastric pain 





CODE STATUS: Full code 


DVT prophylaxis: Heparin 


Discussed with: Patient and RN 


Anticipated discharge date: Clinical course to determine 


Anticipated discharge place: Home


A total of 45 minutes was spent on the care of this complex patient more than 

50% of the time was spent in counseling and care coordination.





Past Medical History


Past Medical History: Coronary Artery Disease (CAD), Diabetes Mellitus, Hearing 

Disorder / Deafness, Hyperlipidemia, Hypertension


Additional Past Medical History / Comment(s): SOB w/exertion, "LEAKY HEART 

VALVE."


History of Any Multi-Drug Resistant Organisms: None Reported


Past Surgical History: Coronary Bypass/CABG, Heart Catheterization With Stent


Additional Past Surgical History / Comment(s): CABG 1990 (LIMA to the LAD, vein 

graft to the RCA, vein graft to the third obtuse marginal).  LT CAROTID ARTERY 

endarterectomy 2009.  PTCA W/ 2 STENTS. 6/11/19 stent RCA, 05/08/20 stent to RCA

graft, 11/06/2020 stent to the RCA graft, heart valve replacement 1/7/21


Past Anesthesia/Blood Transfusion Reactions: No Reported Reaction


Date of Last Stent Placement:: 11/06/20


Past Psychological History: No Psychological Hx Reported


Smoking Status: Former smoker


Past Alcohol Use History: Occasional


Past Drug Use History: None Reported





- Past Family History


  ** Mother


Family Medical History: Cancer





Medications and Allergies


                                Home Medications











 Medication  Instructions  Recorded  Confirmed  Type


 


Aspirin [Adult Low Dose Aspirin EC] 81 mg PO DAILY 04/16/18 07/22/21 History


 


Cholecalciferol (Vitamin D3) 50 mcg PO DAILY 04/16/18 07/22/21 History





[Vitamin D3]    


 


Ezetimibe [Zetia] 10 mg PO DAILY 04/16/18 07/22/21 History


 


Ferrous Sulfate [Iron (65  mg PO DAILY 04/16/18 07/22/21 History





Elemental)]    


 


Metoprolol Succinate [Toprol XL] 50 mg PO DAILY@1600 04/16/18 07/22/21 History


 


Multivitamins, Thera [Multivitamin 1 tab PO DAILY 04/16/18 07/22/21 History





(formulary)]    


 


Benazepril HCl 20 mg PO DAILY@1600 05/30/19 07/22/21 History


 


Clopidogrel [Plavix] 75 mg PO DAILY@1600 05/07/20 07/22/21 History


 


Pantoprazole Sodium 40 mg PO DAILY 05/07/20 07/22/21 History


 


Isosorbide Mononitrate [Isosorbide 30 mg PO DAILY@1600 11/06/20 07/22/21 History





Mononitrate ER]    


 


amLODIPine [Norvasc] 10 mg PO DAILY@1600 11/06/20 07/22/21 History


 


Amoxicillin 2,000 mg PO ONCE PRN 07/22/21 07/22/21 History


 


Ascorbic Acid [Vitamin C] 500 mg PO DAILY 07/22/21 07/22/21 History


 


Atorvastatin [Lipitor] 60 mg PO DAILY@1600 07/22/21 07/22/21 History


 


Dextran 70/Hypromellose [Genteal 1 drop BOTH EYES QID PRN 07/22/21 07/22/21 

History





Tears 0.1%-0.3% Drop]    


 


Nitroglycerin Sl Tabs [Nitrostat] 0.4 mg SL Q5M PRN 07/22/21 07/22/21 History


 


sitaGLIPtin PHOS/metFORMIN HCL 1 tab PO DAILY 07/22/21 07/22/21 History





[Janumet  mg Tablet]    








                                    Allergies











Allergy/AdvReac Type Severity Reaction Status Date / Time


 


No Known Allergies Allergy   Verified 07/22/21 14:21














Physical Exam


Vitals: 


                                   Vital Signs











  Temp Pulse Resp BP Pulse Ox


 


 07/22/21 11:37  98.4 F  74  18  185/73  98








                                Intake and Output











 07/22/21 07/22/21 07/22/21





 06:59 14:59 22:59


 


Other:   


 


  Weight  90.718 kg 














Results


CBC & Chem 7: 


                                 07/22/21 12:04





                                 07/22/21 12:04


Labs: 


                  Abnormal Lab Results - Last 24 Hours (Table)











  07/22/21 07/22/21 Range/Units





  12:04 12:04 


 


WBC  12.5 H   (3.8-10.6)  k/uL


 


RDW  15.6 H   (11.5-15.5)  %


 


Neutrophils # (Manual)  8.88 H   (1.3-7.7)  k/uL


 


Eosinophils # (Manual)  1.13 H   (0-0.7)  k/uL


 


Chloride   109 H  ()  mmol/L


 


Carbon Dioxide   20 L  (22-30)  mmol/L


 


Glucose   119 H  (74-99)  mg/dL

## 2021-07-22 NOTE — ED
General Adult HPI





- General


Chief complaint: Chest Pain


Stated complaint: Chest Pain


Time Seen by Provider: 07/22/21 11:43


Source: patient, RN notes reviewed


Mode of arrival: ambulatory


Limitations: no limitations





- History of Present Illness


Initial comments: 





This is an 80-year-old male presents emergency from chief complaint of 

epigastric discomfort and arm pain  Patient states been having issues with 

reflux, pain in this region.  Patient admits to started after he started taking 

a large amount pomegranate.  Patient states he is having pain daily has to take 

nitro and alleviate his symptoms.  Patient is on current Protonix.  Patient 

states he has seen his cardiologist, PCP for this reason.  Patient was referred 

to GI .  He does admit that he occasionally gets some arm pain which she takes 

nitro IV he did update his cardiologist regarding this.  Patient is scheduled 

see GI next month.  Patient denies any current shortness breath patient had 

aortic valve replacement in January.  Patient states he has mild epigastric 

discomfort.  He states he ended presented today because his PCP told him if he 

was taking nitro and Pepto-Bismol that he should go to the emergency department.





- Related Data


                                Home Medications











 Medication  Instructions  Recorded  Confirmed


 


Aspirin [Adult Low Dose Aspirin EC] 81 mg PO DAILY 04/16/18 07/22/21


 


Cholecalciferol (Vitamin D3) 50 mcg PO DAILY 04/16/18 07/22/21





[Vitamin D3]   


 


Ezetimibe [Zetia] 10 mg PO DAILY 04/16/18 07/22/21


 


Ferrous Sulfate [Iron (65  mg PO DAILY 04/16/18 07/22/21





Elemental)]   


 


Metoprolol Succinate [Toprol XL] 50 mg PO DAILY@1600 04/16/18 07/22/21


 


Multivitamins, Thera [Multivitamin 1 tab PO DAILY 04/16/18 07/22/21





(formulary)]   


 


Benazepril HCl 20 mg PO DAILY@1600 05/30/19 07/22/21


 


Clopidogrel [Plavix] 75 mg PO DAILY@1600 05/07/20 07/22/21


 


Pantoprazole Sodium 40 mg PO DAILY 05/07/20 07/22/21


 


Isosorbide Mononitrate [Isosorbide 30 mg PO DAILY@1600 11/06/20 07/22/21





Mononitrate ER]   


 


amLODIPine [Norvasc] 10 mg PO DAILY@1600 11/06/20 07/22/21


 


Amoxicillin 2,000 mg PO ONCE PRN 07/22/21 07/22/21


 


Ascorbic Acid [Vitamin C] 500 mg PO DAILY 07/22/21 07/22/21


 


Atorvastatin [Lipitor] 60 mg PO DAILY@1600 07/22/21 07/22/21


 


Dextran 70/Hypromellose [Genteal 1 drop BOTH EYES QID PRN 07/22/21 07/22/21





Tears 0.1%-0.3% Drop]   


 


Nitroglycerin Sl Tabs [Nitrostat] 0.4 mg SL Q5M PRN 07/22/21 07/22/21


 


sitaGLIPtin PHOS/metFORMIN HCL 1 tab PO DAILY 07/22/21 07/22/21





[Janumet  mg Tablet]   











                                    Allergies











Allergy/AdvReac Type Severity Reaction Status Date / Time


 


No Known Allergies Allergy   Verified 07/22/21 14:21














Review of Systems


ROS Statement: 


Those systems with pertinent positive or pertinent negative responses have been 

documented in the HPI.





ROS Other: All systems not noted in ROS Statement are negative.





Past Medical History


Past Medical History: Coronary Artery Disease (CAD), Diabetes Mellitus, Hearing 

Disorder / Deafness, Hyperlipidemia, Hypertension


Additional Past Medical History / Comment(s): SOB w/exertion, "LEAKY HEART 

VALVE."


History of Any Multi-Drug Resistant Organisms: None Reported


Past Surgical History: Coronary Bypass/CABG, Heart Catheterization With Stent


Additional Past Surgical History / Comment(s): CABG 1990 (LIMA to the LAD, vein 

graft to the RCA, vein graft to the third obtuse marginal).  LT CAROTID ARTERY 

endarterectomy 2009.  PTCA W/ 2 STENTS. 6/11/19 stent RCA, 05/08/20 stent to RCA

graft, 11/06/2020 stent to the RCA graft, heart valve replacement 1/7/21


Past Anesthesia/Blood Transfusion Reactions: No Reported Reaction


Date of Last Stent Placement:: 11/06/20


Past Psychological History: No Psychological Hx Reported


Smoking Status: Former smoker


Past Alcohol Use History: Occasional


Past Drug Use History: None Reported





- Past Family History


  ** Mother


Family Medical History: Cancer





General Exam


Limitations: no limitations


General appearance: alert, in no apparent distress


Head exam: Present: atraumatic, normocephalic, normal inspection


Eye exam: Present: normal appearance, PERRL, EOMI.  Absent: scleral icterus, 

conjunctival injection, periorbital swelling


ENT exam: Present: normal exam, normal oropharynx, mucous membranes moist


Neck exam: Present: normal inspection, full ROM.  Absent: tenderness, 

meningismus, lymphadenopathy


Respiratory exam: Present: normal lung sounds bilaterally.  Absent: respiratory 

distress, wheezes, rales, rhonchi, stridor


Cardiovascular Exam: Present: regular rate, normal rhythm, normal heart sounds. 

Absent: systolic murmur, diastolic murmur, rubs, gallop, clicks


GI/Abdominal exam: Present: soft, tenderness (Epigastric), normal bowel sounds. 

Absent: distended, guarding, rebound, rigid


Back exam: Absent: CVA tenderness (R), CVA tenderness (L)


Neurological exam: Present: alert


Skin exam: Present: warm, dry, intact, normal color.  Absent: rash





Course


                                   Vital Signs











  07/22/21





  11:37


 


Temperature 98.4 F


 


Pulse Rate 74


 


Respiratory 18





Rate 


 


Blood Pressure 185/73


 


O2 Sat by Pulse 98





Oximetry 














Medical Decision Making





- Medical Decision Making





80-year-old male presented to the emergency Department for epigastric, arm pain.

 Patient had recurrent symptoms in which she's taken nitro to alleviate the pain

almost daily.  Patient will be admitted for cardiac rule out, possible GI 

evaluation.





- Lab Data


Result diagrams: 


                                 07/22/21 12:04





                                 07/22/21 12:04


                                   Lab Results











  07/22/21 07/22/21 07/22/21 Range/Units





  12:04 12:04 12:04 


 


WBC  12.5 H    (3.8-10.6)  k/uL


 


RBC  5.06    (4.30-5.90)  m/uL


 


Hgb  14.2    (13.0-17.5)  gm/dL


 


Hct  43.1    (39.0-53.0)  %


 


MCV  85.2    (80.0-100.0)  fL


 


MCH  28.1    (25.0-35.0)  pg


 


MCHC  33.0    (31.0-37.0)  g/dL


 


RDW  15.6 H    (11.5-15.5)  %


 


Plt Count  313    (150-450)  k/uL


 


MPV  6.9    


 


PT   10.3   (9.0-12.0)  sec


 


INR   1.0   (<1.2)  


 


APTT   22.3   (22.0-30.0)  sec


 


Sodium    138  (137-145)  mmol/L


 


Potassium    4.6  (3.5-5.1)  mmol/L


 


Chloride    109 H  ()  mmol/L


 


Carbon Dioxide    20 L  (22-30)  mmol/L


 


Anion Gap    9  mmol/L


 


BUN    18  (9-20)  mg/dL


 


Creatinine    1.03  (0.66-1.25)  mg/dL


 


Est GFR (CKD-EPI)AfAm    79  (>60 ml/min/1.73 sqM)  


 


Est GFR (CKD-EPI)NonAf    68  (>60 ml/min/1.73 sqM)  


 


Glucose    119 H  (74-99)  mg/dL


 


Calcium    9.8  (8.4-10.2)  mg/dL


 


Magnesium    1.9  (1.6-2.3)  mg/dL


 


Total Bilirubin    0.6  (0.2-1.3)  mg/dL


 


AST    25  (17-59)  U/L


 


ALT    21  (4-49)  U/L


 


Alkaline Phosphatase    115  ()  U/L


 


Troponin I     (0.000-0.034)  ng/mL


 


Total Protein    7.3  (6.3-8.2)  g/dL


 


Albumin    4.1  (3.5-5.0)  g/dL


 


Lipase    187  ()  U/L














  07/22/21 Range/Units





  12:04 


 


WBC   (3.8-10.6)  k/uL


 


RBC   (4.30-5.90)  m/uL


 


Hgb   (13.0-17.5)  gm/dL


 


Hct   (39.0-53.0)  %


 


MCV   (80.0-100.0)  fL


 


MCH   (25.0-35.0)  pg


 


MCHC   (31.0-37.0)  g/dL


 


RDW   (11.5-15.5)  %


 


Plt Count   (150-450)  k/uL


 


MPV   


 


PT   (9.0-12.0)  sec


 


INR   (<1.2)  


 


APTT   (22.0-30.0)  sec


 


Sodium   (137-145)  mmol/L


 


Potassium   (3.5-5.1)  mmol/L


 


Chloride   ()  mmol/L


 


Carbon Dioxide   (22-30)  mmol/L


 


Anion Gap   mmol/L


 


BUN   (9-20)  mg/dL


 


Creatinine   (0.66-1.25)  mg/dL


 


Est GFR (CKD-EPI)AfAm   (>60 ml/min/1.73 sqM)  


 


Est GFR (CKD-EPI)NonAf   (>60 ml/min/1.73 sqM)  


 


Glucose   (74-99)  mg/dL


 


Calcium   (8.4-10.2)  mg/dL


 


Magnesium   (1.6-2.3)  mg/dL


 


Total Bilirubin   (0.2-1.3)  mg/dL


 


AST   (17-59)  U/L


 


ALT   (4-49)  U/L


 


Alkaline Phosphatase   ()  U/L


 


Troponin I  0.032  (0.000-0.034)  ng/mL


 


Total Protein   (6.3-8.2)  g/dL


 


Albumin   (3.5-5.0)  g/dL


 


Lipase   ()  U/L














Disposition


Clinical Impression: 


 Coronary artery disease, Chest pain, Epigastric pain





Disposition: ADMITTED AS IP TO THIS \[facility]\""


Condition: Fair


Referrals: 


Bruce Glasgow MD [Primary Care Provider] - 1-2 days

## 2021-07-22 NOTE — XR
EXAMINATION TYPE: XR chest 2V

 

DATE OF EXAM: 7/22/2021

 

COMPARISON: Chest x-ray 11/7/2020

 

HISTORY: Chest pain, shortness of breath

 

TECHNIQUE:  Frontal and lateral views of the chest are obtained.

 

FINDINGS:  Patient is post median sternotomy and rotated. There are overlying leads. Biapical pleural
 thickening is stable. There is no evident pneumothorax or pleural effusion. Cardiac mediastinal silh
ouette isn't significantly changed. There is improvement in lung volume. Patient is status post TAVR 
procedure. Prominent lung volume with flattening the hemidiaphragms may be indicative of underlying C
OPD. There are coronary artery calcifications. Thoracic spondylosis is noted.

 

IMPRESSION:  No acute cardiopulmonary process. Postop findings. Coronary artery disease.

## 2021-07-23 LAB
ANION GAP SERPL CALC-SCNC: 8 MMOL/L
APTT BLD: 43 SEC (ref 22–30)
BASOPHILS # BLD AUTO: 0.1 X 10*3/UL (ref 0–0.1)
BASOPHILS NFR BLD AUTO: 0.9 %
BUN SERPL-SCNC: 16 MG/DL (ref 9–20)
CALCIUM SPEC-MCNC: 9.4 MG/DL (ref 8.4–10.2)
CHLORIDE SERPL-SCNC: 108 MMOL/L (ref 98–107)
CHOLEST SERPL-MCNC: 152 MG/DL (ref 0–200)
CO2 SERPL-SCNC: 23 MMOL/L (ref 22–30)
EOSINOPHIL # BLD AUTO: 0.82 X 10*3/UL (ref 0.04–0.35)
EOSINOPHIL NFR BLD AUTO: 7.8 %
ERYTHROCYTE [DISTWIDTH] IN BLOOD BY AUTOMATED COUNT: 4.89 X 10*6/UL (ref 4.4–5.6)
ERYTHROCYTE [DISTWIDTH] IN BLOOD: 16.4 % (ref 11.5–14.5)
GLUCOSE BLD-MCNC: 117 MG/DL (ref 75–99)
GLUCOSE SERPL-MCNC: 147 MG/DL (ref 74–99)
HCT VFR BLD AUTO: 42.2 % (ref 39.6–50)
HDLC SERPL-MCNC: 21 MG/DL (ref 40–60)
HGB BLD-MCNC: 13.4 G/DL (ref 13–17)
INR PPP: 1 (ref ?–1.2)
LDLC SERPL CALC-MCNC: 80.8 MG/DL (ref 0–131)
LYMPHOCYTES # SPEC AUTO: 1.78 X 10*3/UL (ref 0.9–5)
LYMPHOCYTES NFR SPEC AUTO: 16.8 %
MAGNESIUM SPEC-SCNC: 1.9 MG/DL (ref 1.6–2.3)
MCH RBC QN AUTO: 27.4 PG (ref 27–32)
MCHC RBC AUTO-ENTMCNC: 31.8 G/DL (ref 32–37)
MCV RBC AUTO: 86.3 FL (ref 80–97)
MONOCYTES # BLD AUTO: 1.19 X 10*3/UL (ref 0.2–1)
MONOCYTES NFR BLD AUTO: 11.2 %
NEUTROPHILS # BLD AUTO: 6.63 X 10*3/UL (ref 1.8–7.7)
NEUTROPHILS NFR BLD AUTO: 62.7 %
PLATELET # BLD AUTO: 251 X 10*3/UL (ref 140–440)
POTASSIUM SERPL-SCNC: 4.3 MMOL/L (ref 3.5–5.1)
PT BLD: 10.6 SEC (ref 9–12)
SODIUM SERPL-SCNC: 139 MMOL/L (ref 137–145)
TRIGL SERPL-MCNC: 251 MG/DL (ref 0–149)
VLDLC SERPL CALC-MCNC: 50.2 MG/DL (ref 5–40)
WBC # BLD AUTO: 10.58 X 10*3/UL (ref 4.5–10)

## 2021-07-23 PROCEDURE — B2111ZZ FLUOROSCOPY OF MULTIPLE CORONARY ARTERIES USING LOW OSMOLAR CONTRAST: ICD-10-PCS

## 2021-07-23 PROCEDURE — B2131ZZ FLUOROSCOPY OF MULTIPLE CORONARY ARTERY BYPASS GRAFTS USING LOW OSMOLAR CONTRAST: ICD-10-PCS

## 2021-07-23 PROCEDURE — 027036Z DILATION OF CORONARY ARTERY, ONE ARTERY WITH THREE DRUG-ELUTING INTRALUMINAL DEVICES, PERCUTANEOUS APPROACH: ICD-10-PCS

## 2021-07-23 PROCEDURE — 4A023N7 MEASUREMENT OF CARDIAC SAMPLING AND PRESSURE, LEFT HEART, PERCUTANEOUS APPROACH: ICD-10-PCS

## 2021-07-23 RX ADMIN — INSULIN ASPART SCH: 100 INJECTION, SOLUTION INTRAVENOUS; SUBCUTANEOUS at 07:22

## 2021-07-23 RX ADMIN — HEPARIN SODIUM ONE UNIT: 1000 INJECTION INTRAVENOUS; SUBCUTANEOUS at 11:28

## 2021-07-23 RX ADMIN — INSULIN ASPART SCH: 100 INJECTION, SOLUTION INTRAVENOUS; SUBCUTANEOUS at 14:38

## 2021-07-23 RX ADMIN — THERA TABS SCH EACH: TAB at 15:20

## 2021-07-23 RX ADMIN — OXYCODONE HYDROCHLORIDE AND ACETAMINOPHEN SCH MG: 500 TABLET ORAL at 09:19

## 2021-07-23 RX ADMIN — PANTOPRAZOLE SODIUM SCH MG: 40 TABLET, DELAYED RELEASE ORAL at 09:20

## 2021-07-23 RX ADMIN — Medication SCH MG: at 15:20

## 2021-07-23 RX ADMIN — INSULIN ASPART SCH: 100 INJECTION, SOLUTION INTRAVENOUS; SUBCUTANEOUS at 18:03

## 2021-07-23 RX ADMIN — Medication SCH MCG: at 09:20

## 2021-07-23 RX ADMIN — CLOPIDOGREL BISULFATE SCH MG: 75 TABLET ORAL at 09:20

## 2021-07-23 RX ADMIN — HEPARIN SODIUM ONE UNIT: 1000 INJECTION INTRAVENOUS; SUBCUTANEOUS at 11:40

## 2021-07-23 RX ADMIN — EZETIMIBE SCH MG: 10 TABLET ORAL at 09:20

## 2021-07-23 RX ADMIN — ISOSORBIDE MONONITRATE SCH MG: 60 TABLET, EXTENDED RELEASE ORAL at 15:20

## 2021-07-23 RX ADMIN — CLOPIDOGREL BISULFATE SCH: 75 TABLET ORAL at 12:56

## 2021-07-23 NOTE — P.PN
Subjective


HISTORY OF PRESENTING ILLNESS


This is a pleasant 80-year-old male past medical history significant for 

coronary artery disease status post CABG in 1990 and PCI most recent 11/2020, 

hyperlipidemia, hypertension, type 2 diabetes, aortic stenosis s/p TAVR 01/2021,

peripheral vascular disease.  He follows in the office with Dr. Farr. We 

have been asked to see in consultation for NSTEMI.  Patient admitted to the 

hospital on 7/22/2021 with progressively worsening discomfort involving the both

upper extremities for the last 2 weeks.  He also had epigastric discomfort and 

some shortness of breath with activity.  Nitro relieved his pain.  He recently 

underwent Lexiscan Cardiolite stress test on 07/9/2021 which revealed abnormal 

perfusion study with mild to moderate partially reversible defect on the 

inferolateral segment suggestive of previous myocardial infarction with sunil-

infarct ischemia.  Troponin trend includes 0.032, 0.039, 0.038.EKG reveals sinus

rhythm with left bundle branch block, heart rate 66.Most recent cardiac 

catheterization was 11/2020 patient was found to have significant stenosis in 

his SVG to RCA proximal to the stented segment.  Patient subsequently went 

stenting with Dr. Crook.  Most recent echocardiogram 10/2020 revealed an EF of 

46%, critical aortic stenosis with a mean gradient of 42 mmHg mildly increased 

pulmonary artery systolic pressure.





Patient seen and examined at bedside, no acute distress.  Overnight patient 

continues to go in and out of atrial fibrillation with controlled ventricular 

rates. This morning he is maintaining sinus mechanism on exam.  Blood pressure 

140/73, heart rate 71, afebrile, maintaining oxygen saturations on room air.  

Laboratory data reviewed WBC 10.5, hemoglobin 13.4, platelets 251, sodium 139, 

potassium 4.3, BUN 16, serum creatinine 1.1, magnesium 1.9.  Patient currently 

maintained on amlodipine 10 mg daily, atorvastatin 60 mg daily, Plavix 75 mg 

daily, study at 10 mg daily, Imdur 60 mg daily, lisinopril 20 mg daily, 

metoprolol succinate 50 mg daily





PHYSICAL EXAMINATION


Blood pressure 122/66 heart rate 66 afebrile and maintaining oxygen saturation 

95% on room air


GENERAL: Well-appearing, well-nourished and in no acute distress.


NECK: Supple without JVD or thyromegaly.


LUNGS: Breath sounds clear to auscultation bilaterally. Respiration equal and 

unlabored.  No wheezes, rales or rhonchi.


HEART: Regular rate and rhythm S1 and S2 heard. Systolic ejection murmur right 

sternal border


EXTREMITIES: Normal range of motion. mild bilateral edema.  No clubbing or 

cyanosis. Peripheral pulses intact.





ASSESSMENT


NSTEMI


New onset Paroxysmal atrial fibrillation CHADS2 score- 6


Coronary artery disease status post CABG in 1990 and PCI most recent 11/2020


Hyperlipidemia


Hypertension


Type 2 diabetes


Severe aortic stenosis s/p TAVR 01/2021


Peripheral vascular disease.





PLAN


-2D echocardiogram ordered 


-We recommend cardiac catheterization at this time. I have discussed the risks, 

benefits and alternative therapies for the above-mentioned procedure and for 

both sedation/analgesia as well as necessary blood product administration, if 

indicated, as they pertain to this patient.  The patient has indicated 

understanding and acceptance of the risks and procedures discussed.  Questions 

have been answered appropriately and he is agreeable to move forward with the 

above-stated procedure. 


-Plan for cardiac catheterization with Dr. Crook today


-Start Xarelto 20mg daily 


-Case management was consulted for anticoagulation coverage. Patient is only 

able to get a free month of Xarelto and subsequent prescriptions for Xarelto 

[facility]. Will send a prescription for a month to pharmacy. 


-Discontinue aspirin. Continue all other cardiac medications. 


-Further recommendations based on clinical course





Nurse Practitioner note has been reviewed, I agree with a documented findings 

and plan of care.  Patient was seen and examined.











Objective





- Vital Signs


Vital signs: 


                                   Vital Signs











Temp  97.5 F L  07/23/21 07:00


 


Pulse  71   07/23/21 07:00


 


Resp  18   07/23/21 08:00


 


BP  148/73   07/23/21 07:00


 


Pulse Ox  97   07/23/21 07:00








                                 Intake & Output











 07/22/21 07/23/21 07/23/21





 18:59 06:59 18:59


 


Intake Total  420.667 92.667


 


Balance  420.667 92.667


 


Weight 90.718 kg  


 


Intake:   


 


  Intake, IV Titration  70.667 92.667





  Amount   


 


    Heparin Sod,Pork in 0.45%  70.667 92.667





    NaCl 25,000 unit In 0.45   





    % NaCl 1 250ml.bag @ 11.   





    023 UNITS/KG/HR 10 mls/hr   





    IV .Q24H [facility] Rx#:   





    [id number]   


 


  Oral  350 


 


Other:   


 


  Voiding Method Toilet Toilet Toilet


 


  # Voids  2 














- Labs


CBC & Chem 7: 


                                 07/23/21 06:14





                                 07/23/21 06:14


Labs: 


                  Abnormal Lab Results - Last 24 Hours (Table)











  07/22/21 07/22/21 07/22/21 Range/Units





  12:04 12:04 15:04 


 


WBC  12.5 H    (3.8-10.6)  k/uL


 


MCHC     (32.0-37.0)  g/dL


 


RDW  15.6 H    (11.5-15.5)  %


 


MPV     (9.5-12.2)  fL


 


Immature Gran #     (0.00-0.04)  X 10*3/uL


 


Neutrophils # (Manual)  8.88 H    (1.3-7.7)  k/uL


 


Monocytes #     (0.20-1.00)  X 10*3/uL


 


Eosinophils #     (0.04-0.35)  X 10*3/uL


 


Eosinophils # (Manual)  1.13 H    (0-0.7)  k/uL


 


APTT     (22.0-30.0)  sec


 


Chloride   109 H   ()  mmol/L


 


Carbon Dioxide   20 L   (22-30)  mmol/L


 


Glucose   119 H   (74-99)  mg/dL


 


POC Glucose (mg/dL)     (75-99)  mg/dL


 


Troponin I    0.039 H*  (0.000-0.034)  ng/mL














  07/22/21 07/22/21 07/22/21 Range/Units





  17:38 19:23 20:02 


 


WBC     (3.8-10.6)  k/uL


 


MCHC     (32.0-37.0)  g/dL


 


RDW     (11.5-15.5)  %


 


MPV     (9.5-12.2)  fL


 


Immature Gran #     (0.00-0.04)  X 10*3/uL


 


Neutrophils # (Manual)     (1.3-7.7)  k/uL


 


Monocytes #     (0.20-1.00)  X 10*3/uL


 


Eosinophils #     (0.04-0.35)  X 10*3/uL


 


Eosinophils # (Manual)     (0-0.7)  k/uL


 


APTT     (22.0-30.0)  sec


 


Chloride     ()  mmol/L


 


Carbon Dioxide     (22-30)  mmol/L


 


Glucose     (74-99)  mg/dL


 


POC Glucose (mg/dL)  119 H   153 H  (75-99)  mg/dL


 


Troponin I   0.038 H*   (0.000-0.034)  ng/mL














  07/22/21 07/23/21 07/23/21 Range/Units





  22:54 06:14 06:14 


 


WBC    10.58 H  (3.8-10.6)  k/uL


 


MCHC    31.8 L  (32.0-37.0)  g/dL


 


RDW    16.4 H  (11.5-15.5)  %


 


MPV    9.0 L  (9.5-12.2)  fL


 


Immature Gran #    0.06 H  (0.00-0.04)  X 10*3/uL


 


Neutrophils # (Manual)     (1.3-7.7)  k/uL


 


Monocytes #    1.19 H  (0.20-1.00)  X 10*3/uL


 


Eosinophils #    0.82 H  (0.04-0.35)  X 10*3/uL


 


Eosinophils # (Manual)     (0-0.7)  k/uL


 


APTT  61.0 H    (22.0-30.0)  sec


 


Chloride   108 H   ()  mmol/L


 


Carbon Dioxide     (22-30)  mmol/L


 


Glucose   147 H   (74-99)  mg/dL


 


POC Glucose (mg/dL)     (75-99)  mg/dL


 


Troponin I     (0.000-0.034)  ng/mL














  07/23/21 07/23/21 Range/Units





  06:14 07:16 


 


WBC    (3.8-10.6)  k/uL


 


MCHC    (32.0-37.0)  g/dL


 


RDW    (11.5-15.5)  %


 


MPV    (9.5-12.2)  fL


 


Immature Gran #    (0.00-0.04)  X 10*3/uL


 


Neutrophils # (Manual)    (1.3-7.7)  k/uL


 


Monocytes #    (0.20-1.00)  X 10*3/uL


 


Eosinophils #    (0.04-0.35)  X 10*3/uL


 


Eosinophils # (Manual)    (0-0.7)  k/uL


 


APTT  43.0 H   (22.0-30.0)  sec


 


Chloride    ()  mmol/L


 


Carbon Dioxide    (22-30)  mmol/L


 


Glucose    (74-99)  mg/dL


 


POC Glucose (mg/dL)   117 H  (75-99)  mg/dL


 


Troponin I    (0.000-0.034)  ng/mL

## 2021-07-23 NOTE — P.PN
Subjective


Progress Note Date: 07/23/21


Patient is an 80-year-old male for history of coronary artery disease status 

post CABG requiring stenting after CABG, recent TAVR on 1/7/21 at [place], 

hypertension, dyslipidemia, type 2 diabetes, and ultimately her comorbid 

conditions who presented to the ER with complaints of chest pain.  Initial EKG 

showed sinus rhythm at 66 with a left bundle branch block and T-wave inversion 

not significantly changed from prior EKG.  Initial troponin was 0.032 with 

slight escalation to 0.039 but remained flat.  He is admitted for further 

monitoring secondary to concerns for unstable angina.  He was seen by cardiology

who recommended cardiac catheterization which was completed on 7/23 with 

stenting of SVG to RCA graft. 





Patient seen and examined at bedside prior to heart cath.  He was chest pain-

free, no nausea, no vomiting no numbness or tingling.  He does report some 

belching and bloating for several days with a history of gastric reflux.  We did

discuss that if his heart Were negative we should pursue workup for 

gastritis/GERD





General:  no distress, appears at stated age


Derm: warm, dry


Head: atraumatic, normocephalic, symmetric


Eyes: EOMI, no lid lag, anicteric sclera


Mouth: no lip lesion, mucus membranes moist


Cardiovascular: S1S2 irreg, no murmur, positive posterior tibial pulse 

bilateral, 


Lungs: CTA bilateral, no rhonchi, no rales , no accessory muscle use


Abdominal: soft,  nontender to palpation, no guarding, no appreciable 

organomegaly


Ext: no gross muscle atrophy, no edema, no contractures


Neuro:  CN II-XI grossly intact, no focal neuro deficits


Psych: Alert, oriented, appropriate affect 





Unstable angina, coronary artery disease


History of CABG


History of recent TAVR


-Status post PCI, awaitng report per nursing stent in SCG to RCA bypass


- ASA, plavix, lipitor, imdur


- Cardio recs appreciated 





Paroxsymal A fib, newly discovered


- Rate controlled


-Continue with anticoagulation


-Continue with beta blocker





Leukocytosis


- likely reactive


- no signs/symptoms of infection


- repeat in AM





Hypertension


-Monitor vital signs and continue daily medication regimen with Imdur, lisinop

ril, metoprolol..





Hyperlipidemia


-Continue daily medication regimen with atorvastatin and Zetia





Type II non-insulin-dependent diabetes mellitus


-Hold oral glycemic medication


-SSI 


- Follow blood sugars 

















Objective





- Vital Signs


Vital signs: 


                                   Vital Signs











Temp  98.3 F   07/23/21 14:54


 


Pulse  65   07/23/21 14:54


 


Resp  18   07/23/21 14:54


 


BP  135/65   07/23/21 14:54


 


Pulse Ox  96   07/23/21 14:54








                                 Intake & Output











 07/22/21 07/23/21 07/23/21





 18:59 06:59 18:59


 


Intake Total  420.667 542.667


 


Output Total   300


 


Balance  420.667 242.667


 


Weight 90.718 kg  


 


Intake:   


 


  IV   450


 


  Intake, IV Titration  70.667 92.667





  Amount   


 


    Heparin Sod,Pork in 0.45%  70.667 92.667





    NaCl 25,000 unit In 0.45   





    % NaCl 1 250ml.bag @ 11.   





    023 UNITS/KG/HR 10 mls/hr   





    IV .Q24H Counts include 234 beds at the [facility] Rx#:   





    [id number]   


 


  Oral  350 


 


Output:   


 


  Urine   300


 


Other:   


 


  Voiding Method Toilet Toilet Toilet


 


  # Voids  2 1














- Labs


CBC & Chem 7: 


                                 07/23/21 06:14





                                 07/23/21 06:14


Labs: 


                  Abnormal Lab Results - Last 24 Hours (Table)











  07/22/21 07/22/21 07/22/21 Range/Units





  12:04 15:04 17:38 


 


WBC     (4.50-10.00)  X 10*3/uL


 


MCHC     (32.0-37.0)  g/dL


 


RDW     (11.5-14.5)  %


 


MPV     (9.5-12.2)  fL


 


Immature Gran #     (0.00-0.04)  X 10*3/uL


 


Neutrophils # (Manual)  8.88 H    (1.3-7.7)  k/uL


 


Monocytes #     (0.20-1.00)  X 10*3/uL


 


Eosinophils #     (0.04-0.35)  X 10*3/uL


 


Eosinophils # (Manual)  1.13 H    (0-0.7)  k/uL


 


APTT     (22.0-30.0)  sec


 


Chloride     ()  mmol/L


 


Glucose     (74-99)  mg/dL


 


POC Glucose (mg/dL)    119 H  (75-99)  mg/dL


 


Troponin I   0.039 H*   (0.000-0.034)  ng/mL














  07/22/21 07/22/21 07/22/21 Range/Units





  19:23 20:02 22:54 


 


WBC     (4.50-10.00)  X 10*3/uL


 


MCHC     (32.0-37.0)  g/dL


 


RDW     (11.5-14.5)  %


 


MPV     (9.5-12.2)  fL


 


Immature Gran #     (0.00-0.04)  X 10*3/uL


 


Neutrophils # (Manual)     (1.3-7.7)  k/uL


 


Monocytes #     (0.20-1.00)  X 10*3/uL


 


Eosinophils #     (0.04-0.35)  X 10*3/uL


 


Eosinophils # (Manual)     (0-0.7)  k/uL


 


APTT    61.0 H  (22.0-30.0)  sec


 


Chloride     ()  mmol/L


 


Glucose     (74-99)  mg/dL


 


POC Glucose (mg/dL)   153 H   (75-99)  mg/dL


 


Troponin I  0.038 H*    (0.000-0.034)  ng/mL














  07/23/21 07/23/21 07/23/21 Range/Units





  06:14 06:14 06:14 


 


WBC   10.58 H   (4.50-10.00)  X 10*3/uL


 


MCHC   31.8 L   (32.0-37.0)  g/dL


 


RDW   16.4 H   (11.5-14.5)  %


 


MPV   9.0 L   (9.5-12.2)  fL


 


Immature Gran #   0.06 H   (0.00-0.04)  X 10*3/uL


 


Neutrophils # (Manual)     (1.3-7.7)  k/uL


 


Monocytes #   1.19 H   (0.20-1.00)  X 10*3/uL


 


Eosinophils #   0.82 H   (0.04-0.35)  X 10*3/uL


 


Eosinophils # (Manual)     (0-0.7)  k/uL


 


APTT    43.0 H  (22.0-30.0)  sec


 


Chloride  108 H    ()  mmol/L


 


Glucose  147 H    (74-99)  mg/dL


 


POC Glucose (mg/dL)     (75-99)  mg/dL


 


Troponin I     (0.000-0.034)  ng/mL














  07/23/21 Range/Units





  07:16 


 


WBC   (4.50-10.00)  X 10*3/uL


 


MCHC   (32.0-37.0)  g/dL


 


RDW   (11.5-14.5)  %


 


MPV   (9.5-12.2)  fL


 


Immature Gran #   (0.00-0.04)  X 10*3/uL


 


Neutrophils # (Manual)   (1.3-7.7)  k/uL


 


Monocytes #   (0.20-1.00)  X 10*3/uL


 


Eosinophils #   (0.04-0.35)  X 10*3/uL


 


Eosinophils # (Manual)   (0-0.7)  k/uL


 


APTT   (22.0-30.0)  sec


 


Chloride   ()  mmol/L


 


Glucose   (74-99)  mg/dL


 


POC Glucose (mg/dL)  117 H  (75-99)  mg/dL


 


Troponin I   (0.000-0.034)  ng/mL

## 2021-07-23 NOTE — CC
CARDIAC CATHETERIZATION REPORT



Mr. Meyers is an 80-year-old male with known history of coronary artery disease,
status

post coronary bypass grafting 30 years ago, multiple percutaneous 
revascularization,

most recently in October of 2020, status post TAVR in January of this year, who

presented with symptoms of chest discomfort and mild troponin elevation, 
consistent

with non STEMI.  Recommendation made regarding cardiac catheterization.  The 
procedure

as well as the risks and the complications were discussed with the patient who 
is in

full understanding and agreement.



PROCEDURE:

Patient was brought to cath lab in a fasting semi-sedated state after receiving

fentanyl and Benadryl and achieving moderate conscious sedated state.  Using 
Xylocaine

anesthesia, Seldinger technique a 6-Maori sheath was introduced in the right 
femoral

artery.  Selective right and left coronary angiography performed using 6-Maori 
4 bend

right and left Nandini catheter. Multiple views taken artery including hemiaxial
views

obtained.  The right coronary artery was not cannulated since it was known to be

chronically occluded.  Subsequently, the right Nandini catheter was used to 
cannulate

the saphenous vein graft to the right coronary artery and LIMA to the LAD and 
images of

the grafts were obtained.  Following that, catheter removed, images were 
reviewed.



FINDINGS:



FLUOROSCOPY:

There was calcification involving the coronary arteries.

1. LEFT MAIN: This is a large-sized vessel, bifurcating into left circumflex.

2. LEFT ANTERIOR DESCENDING ARTERY: Left main coronary artery has mild disease

    distally with no significant  plaque.

3. LEFT (  ) ARTERY: This vessel appears to be totally occluded proximal with no

    significant antegrade flow.

4. LEFT CIRCUMFLEX: This vessel gives rise to a proximal obtuse marginal branch 
that

    could be in the ramus intermedius territory.  There are 3 branches, the AV 
groove,

    left circumflex is totally occluded with no significant (  )flow and there 
is a

    delayed filling of what appears to be a 4th obtuse marginal branch.

5. RIGHT CORONARY ARTERY:  Images of the right coronary artery were not 
performed.

    That is known to be chronically occluded.

6. SAPHENOUS VEIN GRAFT TO THE RIGHT CORONARY ARTERY: The proximal and distal

    anastomotic sites are patent.  The stented segment, the proximal graft is 
patent.

    There is an 80% plaque in the mid segment of the body of the graft. There is

    another 99% stenosis in the distal segment, and a 99% stenosis at the distal

    anastomotic site.  The flow into the PDA is a noted.  There is diffuse 
disease in

    that native vessel.

7. LIMA to LAD: The distal anastomotic site is patent.  The flow into the LIMA 
is

    brisk.



LEFT VENTRICULOGRAM:

Left ventriculogram was not performed.



CONCLUSION:

1. Severe triple-vessel coronary artery disease with chronic occluded LAD and 
right

    coronary artery and AV groove of the left circumflex.

2. Significant disease in the body of the saphenous vein graft to the RCA,

    as well as the distal anastomotic site.

3. Patent LIMA to LAD.



RECOMMENDATION:

In view of finding anatomy, I recommend proceeding with angioplasty and stenting
of the

saphenous vein graft to the right coronary artery.  The procedure as well as the
risks

and the complications were discussed with the patient, who is in full 
understanding and

agreement.





MMODL / IJN: [id number] / Job#: [id number]

MTDD

## 2021-07-23 NOTE — CONS
CONSULTATION



DATE OF SERVICE:

07/23/2021



REASON FOR CONSULTATION:

Epigastric pain.



HISTORY OF PRESENT ILLNESS:

The patient is an 80-year-old pleasant white male with history of coronary artery

disease status post coronary artery bypass grafting in the past, history of aortic

stenosis status post TAVR in January of this year, presents to the hospital with chest

pain and epigastric discomfort as well as shortness of breath.  Subsequently, he was

diagnosed with non ST-segment elevated MI and he underwent cardiac catheterization with

3 stents placement this morning.  We were consulted for chronic intermittent epigastric

pain on and off for the last several months duration.  Since his TAVR surgery in

January of this year he has been having some epigastric discomfort but he denies any

heartburn.  Reports no nausea, no vomiting.  When he presented to the hospital with

severe chest pain and chest pressure, he also had worsening epigastric discomfort too.

Following cardiac catheterization with stent placement this afternoon he is feeling

much better and the epigastric pain has resolved.  He reports no prior history of

peptic ulcer disease or recent NSAID use.



PAST MEDICAL HISTORY:

Significant for coronary artery disease status post CABG many years ago, history of

hypertension, hyperlipidemia, diabetes mellitus, hypercholesteremia.



MEDICATIONS:

Medications at home include metformin, Norvasc, Nitrostat, Toprol, iron, Zetia, Plavix,

Lipitor, aspirin, amoxicillin.



ALLERGIES:

None.



SOCIAL HISTORY:

No smoking, no alcohol use.



FAMILY HISTORY:

Unremarkable.



REVIEW OF SYSTEMS:

CARDIOPULMONARY:  He did have some shortness of breath but currently it is resolved.

He did have some chest pain that has resolved after the cardiac cath.

GI:  As mentioned above.

HEMATOLOGY:  Unremarkable.

PSYCHIATRIC: Unremarkable.

ENT/VISION: Unremarkable.

CONSTITUTIONAL:  No recent weight loss.  No fever, chills, night sweats.

ENDOCRINE:  History of diabetes mellitus.



PHYSICAL EXAMINATION:

He appears comfortable.  No apparent distress.

VITAL SIGNS:  Stable.  Blood pressure is 135/65, pulse rate 65, temperature 98.3.

HEENT examination unremarkable.  Conjunctivae pink, sclerae anicteric.  Oral cavity no

lesions.

NECK: No JVD or lymph node enlargement.

CHEST:  Clear to auscultation.

HEART:  Regular rate and rhythm.

ABDOMEN:  Soft, bowel sounds are positive.

EXTREMITIES:  No pedal edema.

NEURO:  He is alert and oriented x3.  No focal deficits.



LABS:

Done today WBC 10.8, hemoglobin 13, platelets normal.  Basic metabolic panel is within

normal limits.



IMPRESSION:

1. Non ST-segment elevated MI, status post cardiac catheterization with CAD with

    stents placed this morning, presently on aspirin and Plavix.

2. Epigastric pain on and off for the last 6 months duration.  The patient may have a

    component of gastroesophageal reflux disease.  Since being in the hospital he is on

    Protonix 40 mg daily and his symptoms have significantly improved.  In fact,

    currently he does not have any associated abdominal pain.

3. History of atrial fibrillation, on Xarelto.

4. History of diabetes mellitus.

5. History of hypertension.

6. History of hypercholesteremia.



RECOMMENDATIONS:

1. Continue with Protonix 40 mg daily.

2. Briefly educated about diet modification and anti-reflux measures.

3. No need for any endoscopic intervention at the present time.

4. Continue with current medical management.

5. Patient advised to follow up in the office in 3-4 weeks following discharge from

    the hospital.

Thank you for this consultation.





MMGERALDINE / TAVONN: [id number] / Job#: [id number]

## 2021-07-23 NOTE — PTCA
PERCUTANEOUSTRANS CORORONARY ANGIOGRAPHY



Mr. Meyers is an 80-year-old male with known history of coronary artery disease who

presented with symptoms of chest discomfort and evidence of non STEMI.  Underwent

cardiac catheterization and was found to have critical stenosis involving the body of

the saphenous vein graft to the right coronary artery as well as the distal anastomotic

site.  In view of that, recommendation was made regarding angioplasty and stenting.

The procedure as well as the risks and the complications were discussed with the

patient who is in full understanding and agreement.



PROCEDURE:

A 6-Croatian FR4 guiding catheter introduced in the system. After cannulating the

proximal anastomotic site, a 0.014 balanced medium weight J-wire was advanced in the

graft and placed in the mid segment.  Subsequently, another 0.014 balanced medium J-

wire with a microcatheter were introduced and the wire was advanced into the PDA.

Subsequently, the microcatheter was removed and a 2.5 x 12 mm NC Trek balloon was

advanced and inflations were done at maximum of 12 atmospheres.  Following that, the

balloon was removed and a 3.0 x 28 mm Xience Marga stent was deployed and post dilated

to 16 atmospheres.  Following that the balloon was removed and a 2.75 x 18 mm Xience

Marga stent was advanced and deployed distal to the first stent and post dilated at 16

atmospheres.  Following that the balloon was removed and a 3.0 x 28 mm Xience Marga

stent was deployed proximal to the first stent and post dilated at 16 atmospheres.

Following that, a 3.25 x 15 mm NC Trek balloon was advanced and multiple inflations to

maximum of 14 atmospheres were done.  Following that the balloon was removed and a 3.5

x 15 mm NC Trek balloon was advanced and multiple inflations to maximum of 14

atmospheres were done.  After the last inflation, after appropriate wait, the balloon

and the guidewire were withdrawn back in the guiding catheter.  Images were obtained

and repeated. Those images reveal stable successful stenting.  At that point, the

guiding catheter, the balloon and the guidewire were removed.  The sheath was removed.

Hemostasis was obtained with deployment of an Angio-Seal.  There was no immediate

complication.  The patient was returned to his room in stable condition.



Of note, the patient received a total of 9000 units of intravenous heparin, he was

continued on clopidogrel.  His ACT was monitored.  He had chest pain and EKG changes

with the inflation that improved at the end of the procedure.



RESULTS:

Successful stenting of the saphenous vein graft to the right coronary artery with

reduction of stenosis from 99% to less than 5%.



RECOMMENDATION:

Patient will be continued on aspirin, Plavix and statin.  The importance of dual

antiplatelet treatment were discussed with the patient and his family who are in full

understanding and agreement.



Duration of sedation is 75 minutes.





MMGERALDINE / TAVONN: [id number] / Job#: [id number]

## 2021-07-23 NOTE — ECHOF
Referral Reason:mi



MEASUREMENTS

--------

HEIGHT: 170.2 cm

WEIGHT: 90.7 kg

BP: 106/46

RVIDd:   4.3 cm     (< 3.3)

IVSd:   1.1 cm     (0.6 - 1.1)

LVIDd:   5.0 cm     (3.9 - 5.3)

LVPWd:   1.1 cm     (0.6 - 1.1)

IVSs:   1.4 cm

LVIDs:   4.0 cm

LVPWs:   1.5 cm

LAESV Index (A-L):   42.65 ml/m

Ao Diam:   2.0 cm     (2.0 - 3.7)

AV Cusp:   0.9 cm     (1.5 - 2.6)

MV EXCURSION:   16.144 mm     (> 18.000)

MV EF SLOPE:   124 mm/s     (70 - 150)

EPSS:   1.2 cm

MV E Jackson:   0.86 m/s

MV DecT:   279 ms

MV A Jackson:   0.83 m/s

MV E/A Ratio:   1.04 

AV maxP.79 mmHg

AV meanP.76 mmHg

AR PHT:   502 ms

RAP:   5.00 mmHg

RVSP:   22.91 mmHg







FINDINGS

--------

This was a technically difficult study with suboptimal apical views.

Patient is post cardiac catheterization and cannot be in left lateral position.

The left ventricular size is normal.   There is borderline concentric left ventricular hypertrophy.  
 Overall left ventricular systolic function is moderately impaired with, an EF between 35 - 40 %.   S
eptal wall motion is delayed and consistent with prior cardiac surgery.   Apical septum LV wall motio
n is hypokinetic.

The right ventricle is moderately enlarged.

LA is moderately dilated 34-39 ml/m2

The right atrium was not well visualized.

5.0mg of Lumason was utilized for enhancement of images

Interatrial and interventricular septum intact.

There is mild aortic regurgitation.   Peak/mean gradient across the Aortic Valve is 28.79mmHg / 11.76
mmHg.   S\P TAVR 2021

Mild mitral annular calcification present.   Moderate mitral regurgitation is present.

Mild tricuspid regurgitation present.   There is no evidence of pulmonary hypertension.   The right v
entricular systolic pressure, as measured by Doppler, is 22.91mmHg.

There is no pulmonic regurgitation present.

The aortic root size is normal.

IVC Not well visulized.

There is no pericardial effusion.



CONCLUSIONS

--------

1. The left ventricular size is normal.

2. There is borderline concentric left ventricular hypertrophy.

3. Overall left ventricular systolic function is moderately impaired with, an EF between 35 - 40 %.

4. Apical septum LV wall motion is hypokinetic.

5. The right ventricle is moderately enlarged.

6. LA is moderately dilated 34-39 ml/m2

7. There is mild aortic regurgitation.

8. Peak/mean gradient across the Aortic Valve is 28.79mmHg / 11.76mmHg.

9. Mild mitral annular calcification present.

10. Moderate mitral regurgitation is present.

11. Mild tricuspid regurgitation present.





SONOGRAPHER: Brandie Nieves RDCS

## 2021-07-24 VITALS — SYSTOLIC BLOOD PRESSURE: 156 MMHG | DIASTOLIC BLOOD PRESSURE: 51 MMHG | RESPIRATION RATE: 17 BRPM

## 2021-07-24 VITALS — HEART RATE: 63 BPM

## 2021-07-24 VITALS — TEMPERATURE: 97.9 F

## 2021-07-24 LAB
ANION GAP SERPL CALC-SCNC: 9 MMOL/L
BUN SERPL-SCNC: 16 MG/DL (ref 9–20)
CALCIUM SPEC-MCNC: 9.2 MG/DL (ref 8.4–10.2)
CELLS COUNTED: 100
CHLORIDE SERPL-SCNC: 108 MMOL/L (ref 98–107)
CO2 SERPL-SCNC: 20 MMOL/L (ref 22–30)
EOSINOPHIL # BLD MANUAL: 1.06 K/UL (ref 0–0.7)
ERYTHROCYTE [DISTWIDTH] IN BLOOD BY AUTOMATED COUNT: 4.45 M/UL (ref 4.3–5.9)
ERYTHROCYTE [DISTWIDTH] IN BLOOD: 15.8 % (ref 11.5–15.5)
GLUCOSE SERPL-MCNC: 151 MG/DL (ref 74–99)
HCT VFR BLD AUTO: 38.4 % (ref 39–53)
HGB BLD-MCNC: 12.5 GM/DL (ref 13–17.5)
LYMPHOCYTES # BLD MANUAL: 1.17 K/UL (ref 1–4.8)
MCH RBC QN AUTO: 28.1 PG (ref 25–35)
MCHC RBC AUTO-ENTMCNC: 32.6 G/DL (ref 31–37)
MCV RBC AUTO: 86.2 FL (ref 80–100)
MONOCYTES # BLD MANUAL: 0.85 K/UL (ref 0–1)
NEUTROPHILS NFR BLD MANUAL: 71 %
NEUTS SEG # BLD MANUAL: 7.53 K/UL (ref 1.3–7.7)
PLATELET # BLD AUTO: 244 K/UL (ref 150–450)
POTASSIUM SERPL-SCNC: 4.3 MMOL/L (ref 3.5–5.1)
SODIUM SERPL-SCNC: 137 MMOL/L (ref 137–145)
WBC # BLD AUTO: 10.6 K/UL (ref 3.8–10.6)

## 2021-07-24 RX ADMIN — PANTOPRAZOLE SODIUM SCH MG: 40 TABLET, DELAYED RELEASE ORAL at 07:52

## 2021-07-24 RX ADMIN — Medication SCH MCG: at 07:52

## 2021-07-24 RX ADMIN — Medication SCH MG: at 07:52

## 2021-07-24 RX ADMIN — INSULIN ASPART SCH: 100 INJECTION, SOLUTION INTRAVENOUS; SUBCUTANEOUS at 07:51

## 2021-07-24 RX ADMIN — OXYCODONE HYDROCHLORIDE AND ACETAMINOPHEN SCH MG: 500 TABLET ORAL at 07:52

## 2021-07-24 RX ADMIN — THERA TABS SCH EACH: TAB at 07:52

## 2021-07-24 RX ADMIN — EZETIMIBE SCH MG: 10 TABLET ORAL at 07:52

## 2021-07-24 NOTE — P.DS
Providers


Date of admission: 


07/23/21 13:55





Attending physician: 


Lucretia Thomson MD





Consults: 





                                        





07/22/21 14:37


Consult Physician Urgent 


   Consulting Provider: Angela Crook


   Consult Reason/Comments: chest pain


   Do you want consulting provider notified?: Yes


Consult Physician Urgent 


   Consulting Provider: Ines Mcclain


   Consult Reason/Comments: Epigastric pain


   Do you want consulting provider notified?: Yes





07/23/21 12:45


Consult Physician Routine 


   Consulting Provider: [facility]


   Consult Reason/Comments: Post Interventional patient


   Do you want consulting provider notified?: Already Contacted











[facility] 


[facility] [place] Course: 





Admitting/Discharge diagnoses:





Unstable angina


Coronary artery disease


History of CABG


History of recent TAVR


Paroxsymal A fib, newly discovered


Hypertension


Hyperlipidemia


Type II non-insulin-dependent diabetes mellitus








Patient seen and examined at bedside.  Patient denies chest pain shortness of 

breath nausea vomiting fevers or chills.  Patient is eager to go home.





Vitals on discharge: Temperature 97.9F heart rate 63 respiratory rate 17 blood 

pressure 156/51 before medication given


Labs: White blood cell count 10.6 hemoglobin 12.5 hematocrit 30.4 platelets 244.

 Sodium 137 potassium 4.3 chloride 108 bicarb 20 glucose 151. 





Physical exam: 


Gen. A & O 3


Heart regular rate and rhythm with systolic murmur


Lungs clear to auscultation bilaterally


Extremities negative edema cyanosis or clubbing





Patient is an 80-year-old male for history of coronary artery disease status 

post CABG requiring stenting after CABG, recent TAVR on 1/7/21 at [facility], 

hypertension, dyslipidemia, type 2 diabetes, and ultimately her comorbid 

conditions who presented to the ER with complaints of chest pain.  Initial EKG 

showed sinus rhythm at 66 with a left bundle branch block and T-wave inversion 

not significantly changed from prior EKG.  Initial troponin was 0.032 with 

slight escalation to 0.039 but remained flat.  He was admitted for further 

monitoring secondary to concerns for unstable angina.  He was seen by cardiology

who recommended cardiac catheterization which was completed on 7/23 with 

stenting of SVG to RCA graft.  Patient is doing well.





07/23/2021 echocardiogram


Ejection fraction of 35-40%


Apical septum LV wall motion is hypokinetic


The right ventricle is moderately enlarged


Mild aortic regurgitation


Moderate mitral regurgitation





Disposition home


Condition fair


Diet diabetic





Follow-up with PCP in 1 week


Follow-up with cardiology in 1-2 weeks











Assessment: 


Unstable angina


Coronary artery disease


History of CABG


History of recent TAVR


Paroxsymal A fib, newly discovered


Hypertension


Hyperlipidemia


Type II non-insulin-dependent diabetes mellitus


Patient Condition at Discharge: Fair





Plan - Discharge Summary


Discharge Rx Participant: Yes


New Discharge Prescriptions: 


New


   Rivaroxaban [Xarelto] 20 mg PO W/SUPPER 30 Days #30 tab





Continue


   Aspirin [Adult Low Dose Aspirin EC] 81 mg PO DAILY


   Multivitamins, Thera [Multivitamin (formulary)] 1 tab PO DAILY


   Cholecalciferol (Vitamin D3) [Vitamin D3] 50 mcg PO DAILY


   Ferrous Sulfate [Iron (65 MG Elemental)] 325 mg PO DAILY


   Metoprolol Succinate [Toprol XL] 50 mg PO DAILY@1600


   Ezetimibe [Zetia] 10 mg PO DAILY


   Benazepril HCl 20 mg PO DAILY@1600


   Clopidogrel [Plavix] 75 mg PO DAILY@1600


   Pantoprazole Sodium 40 mg PO DAILY


   Isosorbide Mononitrate [Isosorbide Mononitrate ER] 30 mg PO DAILY@1600


   amLODIPine [Norvasc] 10 mg PO DAILY@1600


   Atorvastatin [Lipitor] 60 mg PO DAILY@1600


   Ascorbic Acid [Vitamin C] 500 mg PO DAILY


   sitaGLIPtin PHOS/metFORMIN HCL [Janumet  mg Tablet] 1 tab PO DAILY


   Nitroglycerin Sl Tabs [Nitrostat] 0.4 mg SL Q5M PRN


     PRN Reason: Chest Pain


   Dextran 70/Hypromellose [Genteal Tears 0.1%-0.3% Drop] 1 drop BOTH EYES QID 

PRN


     PRN Reason: Dry Eye(S)





Discontinued


   Amoxicillin 2,000 mg PO ONCE PRN


     PRN Reason: 1 hour prior to dental appoint


Discharge Medication List





Aspirin [Adult Low Dose Aspirin EC] 81 mg PO DAILY 04/16/18 [History]


Cholecalciferol (Vitamin D3) [Vitamin D3] 50 mcg PO DAILY 04/16/18 [History]


Ezetimibe [Zetia] 10 mg PO DAILY 04/16/18 [History]


Ferrous Sulfate [Iron (65 MG Elemental)] 325 mg PO DAILY 04/16/18 [History]


Metoprolol Succinate [Toprol XL] 50 mg PO DAILY@1600 04/16/18 [History]


Multivitamins, Thera [Multivitamin (formulary)] 1 tab PO DAILY 04/16/18 

[History]


Benazepril HCl 20 mg PO DAILY@1600 05/30/19 [History]


Clopidogrel [Plavix] 75 mg PO DAILY@1600 05/07/20 [History]


Pantoprazole Sodium 40 mg PO DAILY 05/07/20 [History]


Isosorbide Mononitrate [Isosorbide Mononitrate ER] 30 mg PO DAILY@1600 11/06/20 

[History]


amLODIPine [Norvasc] 10 mg PO DAILY@1600 11/06/20 [History]


Ascorbic Acid [Vitamin C] 500 mg PO DAILY 07/22/21 [History]


Atorvastatin [Lipitor] 60 mg PO DAILY@1600 07/22/21 [History]


Dextran 70/Hypromellose [Genteal Tears 0.1%-0.3% Drop] 1 drop BOTH EYES QID PRN 

07/22/21 [History]


Nitroglycerin Sl Tabs [Nitrostat] 0.4 mg SL Q5M PRN 07/22/21 [History]


sitaGLIPtin PHOS/metFORMIN HCL [Janumet  mg Tablet] 1 tab PO DAILY 

07/22/21 [History]


Rivaroxaban [Xarelto] 20 mg PO W/SUPPER 30 Days #30 tab 07/23/21 [Rx]








Follow up Appointment(s)/Referral(s): 


Bruce Glasgow MD [Primary Care Provider] - 1-2 days


Melo Farr MD [STAFF PHYSICIAN] - 1 Week


[facility] [REFERRING] - 1-2 Days


Patient Instructions/Handouts:  *Surgery [facility] - After Heart Catheterization - 

Ambulatory Care Instructions, A-fib (Atrial Fibrillation) (DC), Coronary 

Intravascular Stent Placement (DC)


Activity/Diet/Wound Care/Special Instructions: 


****DISCHARGE medication list MUST be faxed to the [facility] at discharge so rx 

can be ordered****


Discharge Disposition: HOME SELF-CARE

## 2021-07-24 NOTE — PN
PROGRESS NOTE



Mr. Meyers is an 80-year-old male who presented with evidence of non ST-segment

elevation myocardial infarction, underwent cardiac catheterization and was found to

have critical stenosis involving the saphenous vein graft to the right coronary artery.

He underwent stenting of that vessel. He is doing well this morning. His breathing is

stable.  He denies any symptoms of chest pain.  He denies any dizziness or palpitation.

He denies any nausea.  He has been ambulating without much difficulty.  He continues to

be at this time on amlodipine 10 mg daily, Lipitor 60 mg daily, Plavix 75 mg daily,

Zetia 10 mg daily, lisinopril 20 mg daily, isosorbide mononitrate 30 mg daily,

metoprolol succinate 50 mg daily, and he was restarted on his Xarelto.



PHYSICAL EXAMINATION:

VITAL SIGNS: Blood pressure 121/60 with a heart rate 60.

LUNGS:  Clear.

HEART:  Regular rate and rhythm S1, S2.  No S3. Systolic murmur, no diastolic murmur.

ABDOMEN:  Soft and nontender.

EXTREMITIES:  No edema. Right groin no hematoma.



LAB DATA:

Lab data revealed hemoglobin 12.5, BUN creatinine 16 and 1.16.



IMPRESSION:

1. Status post non STEMI and stenting of saphenous vein graft to the right coronary

    artery.

2. Status post coronary artery bypass grafting.

3. Paroxysmal fibrillation.

4. Hypertension.

5. Hyperlipidemia.



RECOMMENDATION:

Patient should be able to be discharged home today and followed as an outpatient with

Dr. Farr.  I will initiate treatment with aspirin for 1 week and then that can

be stopped.





MMODL / IJN: [id number] / Job#: [id number]

## 2021-07-26 NOTE — CDI
Documentation Clarification Form



Date: 8/11/21 10:58:50 AM

From: Roly Vernon

MRN: [id number]

Admit Date: 07/23/2021 01:55:00 PM

Patient Name: Tim Meyers

Visit Number: [id number]

Discharge Date:  07/24/2021 11:45:00 AM





ATTENTION: The Clinical Documentation Specialists (CDI) and [facility] Coding Staff 
appreciate your assistance in clarifying documentation. Please respond to the 
clarification below the line at the bottom and electronically sign. The CDI & 
[facility] Coding staff will review the response and follow-up if needed. Please note: 
Queries are made part of the Legal Health Record. If you have any questions, 
please contact the author of this message via ITS.



Dr. Bull,

Conflicting documentation has been found in the medical record.  As attending 
physician, please provide clarification.



Progress note 7/24 indicates NSTEMI

Discharge summary indicates unstable angina which codes differently than NSTEMI



History/Risk Factors: LBBB, prior CABG



Clinical Indicators: chest pain



Treatment: PTCA of graft



Please clarify which diagnosis is most appropriate:



[  ]  NSTEMI

[  ]  unstable angina only

[  ]  Other (please specify) __________

[  ]  Unable to determine 



______________________________________________________

NSTEMI with unstable angina 

MTDD

## 2021-08-18 NOTE — CDI
Documentation Clarification Form



Date: 08/18/2021 11:31:55 AM

From: Roly Vernon

MRN: [id number]

Admit Date: 07/23/2021 01:55:00 PM

Patient Name: Tim Meyers

Visit Number: [id number]

Discharge Date:  07/24/2021 11:45:00 AM





ATTENTION: The Clinical Documentation Specialists (CDI) and [facility] Coding Staff 
appreciate your assistance in clarifying documentation. Please respond to the 
clarification below the line at the bottom and electronically sign. The CDI & 
[facility] Coding staff will review the response and follow-up if needed. Please note: 
Queries are made part of the Legal Health Record. If you have any questions, 
please contact the author of this message via ITS.



Dr. Lucretia Thomson







Conflicting documentation has been found in the medical record.  As attending 
physician, please provide clarification.



Progress note 7/24 indicates NSTEMI

Discharge summary states unstable angina



History/Risk Factors: PTCA prior



Clinical Indicators: chest pain



Treatment:



Please clarify which diagnosis is most appropriate:



[  ]  NSTEMI

[  ]  Unstable angina only

[  ]  Other (please specify) __________

[  ]  Unable to determine 







MTDD

## 2022-04-11 ENCOUNTER — HOSPITAL ENCOUNTER (EMERGENCY)
Dept: HOSPITAL 47 - EC | Age: 82
End: 2022-04-11
Payer: MEDICARE

## 2022-04-11 DIAGNOSIS — E78.5: ICD-10-CM

## 2022-04-11 DIAGNOSIS — Z79.02: ICD-10-CM

## 2022-04-11 DIAGNOSIS — E11.9: ICD-10-CM

## 2022-04-11 DIAGNOSIS — I46.9: Primary | ICD-10-CM

## 2022-04-11 DIAGNOSIS — I25.10: ICD-10-CM

## 2022-04-11 DIAGNOSIS — I10: ICD-10-CM

## 2022-04-11 DIAGNOSIS — Z95.5: ICD-10-CM

## 2022-04-11 DIAGNOSIS — Z79.01: ICD-10-CM

## 2022-04-11 DIAGNOSIS — Z79.82: ICD-10-CM

## 2022-04-11 DIAGNOSIS — Z79.84: ICD-10-CM

## 2022-04-11 DIAGNOSIS — Z87.891: ICD-10-CM

## 2022-04-11 DIAGNOSIS — Z79.899: ICD-10-CM

## 2022-04-11 PROCEDURE — 99285 EMERGENCY DEPT VISIT HI MDM: CPT

## 2022-04-11 NOTE — ED
General Adult HPI





- General


Chief complaint: Cardiac Arrest/CPR


Stated complaint: UNRESPONSIVE


Time Seen by Provider: 22 18:30


Source: family, EMS, RN notes reviewed, old records reviewed


Mode of arrival: EMS


Limitations: altered mental status, physical limitation





- History of Present Illness


Initial comments: 





80 yo male who presented in cardiac arrest.  Patient was initially found to be 

in asystole by paramedics.  His arrest was not witnessed.  He was found in the 

bathroom by his wife.  There was some facial trauma according to the wife.  Par

amedics intubated and begun resuscitation according to ACLS protocol.  They had 

a brief return of spontaneous circulation.  His total downtime with paramedics 

and prior to arrival was somewhat between one and 2 hours.  He received a total 

epinephrine doses by EMS.  He received CPR with the Ivan device.  He had a 

history of CAD, heart felt issues.





- Related Data


                                Home Medications











 Medication  Instructions  Recorded  Confirmed


 


Aspirin [Adult Low Dose Aspirin EC] 81 mg PO DAILY 18


 


Cholecalciferol (Vitamin D3) 50 mcg PO DAILY 18





[Vitamin D3]   


 


Ezetimibe [Zetia] 10 mg PO DAILY 18


 


Ferrous Sulfate [Iron (65  mg PO DAILY 18





Elemental)]   


 


Metoprolol Succinate [Toprol XL] 50 mg PO DAILY@18


 


Multivitamins, Thera [Multivitamin 1 tab PO DAILY 18





(formulary)]   


 


Benazepril HCl 20 mg PO DAILY@19


 


Clopidogrel [Plavix] 75 mg PO DAILY@20


 


Pantoprazole Sodium 40 mg PO DAILY 20


 


Isosorbide Mononitrate [Isosorbide 30 mg PO DAILY@20





Mononitrate ER]   


 


amLODIPine [Norvasc] 10 mg PO DAILY@20


 


Ascorbic Acid [Vitamin C chew] 500 mg PO DAILY 21


 


Atorvastatin [Lipitor] 60 mg PO DAILY@21


 


Dextran 70/Hypromellose [Genteal 1 drop BOTH EYES QID PRN 21





Tears 0.1%-0.3% Drop]   


 


Nitroglycerin Sl Tabs [Nitrostat] 0.4 mg SL Q5M PRN 21


 


sitaGLIPtin PHOS/metFORMIN HCL 1 tab PO DAILY 21





[Janumet  mg Tablet]   








                                  Previous Rx's











 Medication  Instructions  Recorded


 


Rivaroxaban [Xarelto] 20 mg PO W/SUPPER 30 Days #30 tab 21











                                    Allergies











Allergy/AdvReac Type Severity Reaction Status Date / Time


 


No Known Allergies Allergy   Verified 21 14:21














Review of Systems


ROS Statement: 


Those systems with pertinent positive or pertinent negative responses have been 

documented in the HPI.





ROS Other: All systems not noted in ROS Statement are negative.





Past Medical History


Past Medical History: Coronary Artery Disease (CAD), Diabetes Mellitus, Hearing 

Disorder / Deafness, Hyperlipidemia, Hypertension


Additional Past Medical History / Comment(s): SOB w/exertion, "LEAKY HEART 

VALVE."


History of Any Multi-Drug Resistant Organisms: None Reported


Past Surgical History: Coronary Bypass/CABG, Heart Catheterization With Stent


Additional Past Surgical History / Comment(s): CABG  (LIMA to the LAD, vein 

graft to the RCA, vein graft to the third obtuse marginal).  LT CAROTID ARTERY 

endarterectomy .  PTCA W/ 2 STENTS. 19 stent RCA, 20 stent to RCA

 graft, 2020 stent to the RCA graft, heart valve replacement 21


Past Anesthesia/Blood Transfusion Reactions: No Reported Reaction


Date of Last Stent Placement:: 20


Past Psychological History: No Psychological Hx Reported


Smoking Status: Former smoker


Past Alcohol Use History: Occasional


Past Drug Use History: None Reported





- Past Family History


  ** Mother


Family Medical History: Cancer





General Exam


Limitations: no limitations


General appearance: other (Unresponsive, no spontaneous movement, cyanotic)


Eye exam: Absent: PERRL (Pupils are nonreactive 5 mm bilaterally, no corneal 

reflex)


Respiratory exam: Present: other (Bilateral breath sounds with BVM no 

spontaneous respirations.)


Cardiovascular Exam: Present: other (No spontaneous heart sounds.)


GI/Abdominal exam: Present: distended.  Absent: tenderness, guarding, rebound


Neurological exam: Present: other (No signs of life.)


Skin exam: Present: cyanosis





Course





                                   Vital Signs











  22





  18:42


 


Pulse Rate 0 L


 


Respiratory 0 L





Rate 














Medical Decision Making





- Medical Decision Making





81-year-old male who presented in cardiac arrest with prolonged downtime.  

Pupils are fixed and dilated there is no signs of life.  He's in the St. 

bradycardic wide complex PEA very close to asystole on initial assessment.  

Bedside ultrasound shows no cardiac activity.








Despite resuscitative efforts ultimately time of death is called at 1828.








Patient is not a candidate for medical examiner will be taken to the  

home.








Primary care has been paged.





Disposition


Clinical Impression: 


 Cardiac arrest





Disposition: 


Condition: Undetermined


Is patient prescribed a controlled substance at d/c from ED?: No


Referrals: 


Bruce Glasgow MD [Primary Care Provider] - 1-2 days


Time of Disposition: 18:28


Preliminary Cause of Death: Cardiac arrest

## 2023-05-05 NOTE — ED
General Adult HPI





- General


Chief complaint: Urogenital


Stated complaint: Catheter Issue/Removal


Time Seen by Provider: 01/15/21 09:07


Source: patient, RN notes reviewed


Mode of arrival: ambulatory


Limitations: no limitations





- History of Present Illness


Initial comments: 


80-year-old male presents emergency Department with chief complaint of wanting 

Moreno catheter out.  Patient states that he had a Moreno catheter placed over a 

week ago as he cannot urinate post surgical.  Patient states that they attempted

to remove it was unable to go.  Patient states he is scheduled for follow-up 

next week states that he does not want this and anymore.  Patient states that 

tolerated.  Patient states that he had pain before but states that has all 

resolved.  No fevers chills.  No other complaints.








- Related Data


                                Home Medications











 Medication  Instructions  Recorded  Confirmed


 


Aspirin [Adult Low Dose Aspirin EC] 81 mg PO HS 04/16/18 11/06/20


 


Atorvastatin [Lipitor] 60 mg PO HS 04/16/18 11/06/20


 


Cholecalciferol (Vitamin D3) 2,000 unit PO DAILY 04/16/18 11/06/20





[Vitamin D3]   


 


Ezetimibe [Zetia] 10 mg PO DAILY 04/16/18 11/06/20


 


Ferrous Sulfate [Iron (65  mg PO DAILY 04/16/18 11/06/20





Elemental)]   


 


Metoprolol Succinate [Toprol XL] 50 mg PO HS 04/16/18 11/06/20


 


Multivitamins, Thera [Multivitamin 1 tab PO DAILY 04/16/18 11/06/20





(formulary)]   


 


Vitamin C Gummy 1 tab PO DAILY 04/16/18 11/06/20


 


Benazepril HCl 20 mg PO DAILY 05/30/19 11/06/20


 


Clopidogrel [Plavix] 75 mg PO DAILY 05/07/20 11/06/20


 


Pantoprazole Sodium 40 mg PO DAILY 05/07/20 11/06/20


 


Isosorbide Mononitrate [Isosorbide 30 mg PO DAILY 11/06/20 11/06/20





Mononitrate ER]   


 


amLODIPine [Norvasc] 10 mg PO DAILY 11/06/20 11/06/20








                                  Previous Rx's











 Medication  Instructions  Recorded


 


Nitroglycerin Sl Tabs [Nitrostat] 0.4 mg SUBLINGUAL Q5M PRN #25 tab 06/12/19


 


sitaGLIPtin PHOS/metFORMIN HCL 1 each PO DAILY #0 06/12/19





[Janumet Xr  mg Tablet]  











                                    Allergies











Allergy/AdvReac Type Severity Reaction Status Date / Time


 


No Known Allergies Allergy   Verified 01/15/21 09:05














Review of Systems


ROS Statement: 


Those systems with pertinent positive or pertinent negative responses have been 

documented in the HPI.





ROS Other: All systems not noted in ROS Statement are negative.





Past Medical History


Past Medical History: Coronary Artery Disease (CAD), Diabetes Mellitus, Hearing 

Disorder / Deafness, Hyperlipidemia, Hypertension


Additional Past Medical History / Comment(s): SOB w/exertion, "LEAKY HEART 

VALVE."


History of Any Multi-Drug Resistant Organisms: None Reported


Past Surgical History: Coronary Bypass/CABG, Heart Catheterization With Stent


Additional Past Surgical History / Comment(s): CABG 1990 (LIMA to the LAD, vein 

graft to the RCA, vein graft to the third obtuse marginal).  LT CAROTID ARTERY 

endarterectomy 2009.  PTCA W/ 2 STENTS. 6/11/19 stent RCA, 05/08/20 stent to RCA

graft, 11/06/2020 stent to the RCA graft, heart valve replacement 1/7/21


Past Anesthesia/Blood Transfusion Reactions: No Reported Reaction


Date of Last Stent Placement:: 11/06/20


Past Psychological History: No Psychological Hx Reported


Smoking Status: Former smoker


Past Alcohol Use History: Occasional


Past Drug Use History: None Reported





- Past Family History


  ** Mother


Family Medical History: Cancer





General Exam


Limitations: no limitations


General appearance: alert, in no apparent distress


Head exam: Present: atraumatic, normocephalic, normal inspection


Eye exam: Present: normal appearance, PERRL, EOMI.  Absent: scleral icterus, 

conjunctival injection, periorbital swelling


Respiratory exam: Present: normal lung sounds bilaterally.  Absent: respiratory 

distress, wheezes, rales, rhonchi, stridor


Cardiovascular Exam: Present: regular rate, normal rhythm, normal heart sounds. 

Absent: systolic murmur, diastolic murmur, rubs, gallop, clicks


GI/Abdominal exam: Present: soft, normal bowel sounds.  Absent: distended, 

tenderness, guarding, rebound, rigid





Course


                                   Vital Signs











  01/15/21





  09:00


 


Temperature 98 F


 


Pulse Rate 71


 


Respiratory 18





Rate 


 


Blood Pressure 137/64


 


O2 Sat by Pulse 98





Oximetry 














Medical Decision Making





- Medical Decision Making





I did warn patient's if the catheter out that he may not able to urinate he may 

need to return for catheter placement.  I did recommend to be catheter in place 

until he follows up patient refuses understands risk, family member in the room.





Disposition


Clinical Impression: 


 Urinary retention, Encounter for Moreno catheter removal





Disposition: HOME SELF-CARE


Condition: Stable


Instructions (If sedation given, give patient instructions):  Urinary Retention 

in Men (ED)


Additional Instructions: 


Please return to the emergency Department if you're unable to urinate.Please 

return to the Emergency Department if symptoms worsen or any other concerns.


Is patient prescribed a controlled substance at d/c from ED?: No


Referrals: 


Bruce Glasgow MD [Primary Care Provider] - 1-2 days


Time of Disposition: 09:37
Adult